# Patient Record
Sex: MALE | Race: WHITE | NOT HISPANIC OR LATINO | Employment: OTHER
[De-identification: names, ages, dates, MRNs, and addresses within clinical notes are randomized per-mention and may not be internally consistent; named-entity substitution may affect disease eponyms.]

---

## 2019-11-02 ENCOUNTER — HEALTH MAINTENANCE LETTER (OUTPATIENT)
Age: 59
End: 2019-11-02

## 2020-11-14 ENCOUNTER — HEALTH MAINTENANCE LETTER (OUTPATIENT)
Age: 60
End: 2020-11-14

## 2021-09-12 ENCOUNTER — HEALTH MAINTENANCE LETTER (OUTPATIENT)
Age: 61
End: 2021-09-12

## 2022-01-02 ENCOUNTER — HEALTH MAINTENANCE LETTER (OUTPATIENT)
Age: 62
End: 2022-01-02

## 2022-06-15 NOTE — TELEPHONE ENCOUNTER
REFERRAL INFORMATION:    Referring Provider:  Self    Reason for Visit/Diagnosis: ** Hernia (Inguinal)// RECS in epic and [facility] in [place]// appt per pt       FUTURE VISIT INFORMATION:    Appointment Date: 6.21.22    Appointment Time: 11:30     NOTES RECORD STATUS  DETAILS

## 2022-06-20 ENCOUNTER — PATIENT OUTREACH (OUTPATIENT)
Dept: SURGERY | Facility: CLINIC | Age: 62
End: 2022-06-20
Payer: COMMERCIAL

## 2022-06-20 NOTE — PROGRESS NOTES
Patient Telephone Reminder Call    Date of call:  06/20/22  Phone numbers:  Cell number on file:    Telephone Information:   Mobile [phone]       Reached patient/confirmed appointment:  No - left message:   on voicemail  Appointment with:   Dr. Michele Stearns  Reason for visit:  Hernia

## 2022-06-21 ENCOUNTER — OFFICE VISIT (OUTPATIENT)
Dept: SURGERY | Facility: CLINIC | Age: 62
End: 2022-06-21
Payer: COMMERCIAL

## 2022-06-21 ENCOUNTER — PRE VISIT (OUTPATIENT)
Dept: SURGERY | Facility: CLINIC | Age: 62
End: 2022-06-21

## 2022-06-21 VITALS
BODY MASS INDEX: 34.6 KG/M2 | OXYGEN SATURATION: 95 % | HEIGHT: 76 IN | HEART RATE: 68 BPM | DIASTOLIC BLOOD PRESSURE: 73 MMHG | WEIGHT: 284.1 LBS | SYSTOLIC BLOOD PRESSURE: 133 MMHG

## 2022-06-21 DIAGNOSIS — K42.9 UMBILICAL HERNIA WITHOUT OBSTRUCTION AND WITHOUT GANGRENE: ICD-10-CM

## 2022-06-21 DIAGNOSIS — K40.20 BILATERAL INGUINAL HERNIA WITHOUT OBSTRUCTION OR GANGRENE, RECURRENCE NOT SPECIFIED: Primary | ICD-10-CM

## 2022-06-21 PROCEDURE — 99203 OFFICE O/P NEW LOW 30 MIN: CPT | Performed by: SURGERY

## 2022-06-21 RX ORDER — CEFAZOLIN SODIUM IN 0.9 % NACL 3 G/100 ML
3 INTRAVENOUS SOLUTION, PIGGYBACK (ML) INTRAVENOUS
Status: CANCELLED | OUTPATIENT
Start: 2022-06-21

## 2022-06-21 RX ORDER — ROSUVASTATIN CALCIUM 20 MG/1
1 TABLET, COATED ORAL EVERY EVENING
COMMUNITY
Start: 2021-02-08

## 2022-06-21 RX ORDER — CEFAZOLIN SODIUM IN 0.9 % NACL 3 G/100 ML
3 INTRAVENOUS SOLUTION, PIGGYBACK (ML) INTRAVENOUS SEE ADMIN INSTRUCTIONS
Status: CANCELLED | OUTPATIENT
Start: 2022-06-21

## 2022-06-21 ASSESSMENT — PAIN SCALES - GENERAL: PAINLEVEL: SEVERE PAIN (7)

## 2022-06-21 NOTE — NURSING NOTE
"Chief Complaint   Patient presents with     New Patient     Inguinal hernia       Vitals:    06/21/22 1128   BP: 133/73   BP Location: Left arm   Patient Position: Sitting   Cuff Size: Adult Large   Pulse: 68   SpO2: 95%   Weight: 128.9 kg (284 lb 1.6 oz)   Height: 1.93 m (6' 4\")       Body mass index is 34.58 kg/m .                          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

## 2022-06-21 NOTE — PATIENT INSTRUCTIONS
You met with Dr. Michele Stearns.      Today's visit instructions:    Reminder:  Surgery Requirements  Your surgery will be at [facility] [place]- 5th Floor  You will need to arrive 1.5 - 2 hours early based on the location of your surgery ([facility] 1.5 hours, [facility]  2 hours).  You will need someone to drive you home (over 18 years old) and stay with you for 24 hours after the procedure.  You will need a preop physical with your regular doctor (or PAC if requested by your surgeon) within 30 days of surgery- closer is always better.  Stop any blood thinners (ok to take ASA 81 mg), vitamins, minerals, or herbal supplements 5 days before surgery.  If you are taking a prescribed blood thinner please let us know for specific instructions.  Fasting- a nurse from [facility] will call you 1-2 days before surgery to confirm your procedure and tell you when to stop eating and drinking.   Wash with the soap (Antibacterial, Dial Complete Foam, Hibiclense, or soap given/mailed from the clinic) the night before surgery and morning of surgery. See instructions in the Surgery Packet.  You will need to undergo a COVID-19 test 2-4 days prior to your scheduled procedure.  Please see the handout . Our surgery scheduler can help arrange testing if you do not have a home test.  If you would like a procedure estimate please call Cost of Care at [phone].    If you have questions please contact Nga RN or Cristina RN during regular clinic hours, Monday through Friday 7:30 AM - 4:00 PM, or you can contact us via [facility] at anytime.       If you have urgent needs after-hours, weekends, or holidays please call the hospital at [phone] and ask to speak with our on-call General Surgery Team.    Appointment [phone]  Nurse Advice (Nga or Cristina): [phone]   Surgery Scheduler (Branden): [phone]  Fax: [phone]    After Your Robotic Inguinal Hernia Repair         Incision care    Remove the bandage the day after surgery, but leave the medical tape (Steri-Strips) or glue in place. These will loosen and fall off on their own 1-2 weeks after surgery.   You may take a shower the day after surgery. Carefully wash your incision with soap and water. Do not submerge yourself in water (bath, whirlpool, hot tub, pool, lake) for 14 days after surgery.     Always wash your hands before touching your incisions or removing bandages.   It is not unusual to form a collection of fluid or blood under your incision that may feel firm or squishy- it can take several weeks to months for your body to reabsorb it.  At times, it may even drain.  If that should happen keep the area clean with soap, water,  and cover with a clean gauze dressing. You can change this daily or as needed.     Other medicines   Wait to start aspirin or blood thinners until the day after surgery. You can continue your regular medicines at your normal time the day after surgery.   Your pain medicine may cause constipation (hard, dry stools). To help with this, take the stool softener your doctor gave you or an over-the-counter stool softener or laxative. You can stop taking this when you are no longer taking pain medicine and your bowel movements are back to normal.      For pain or discomfort   Take the narcotic pain medicine your doctor gave you as needed and as instructed on the bottle. If you prefer to use over-the-counter medication, use acetaminophen (Tylenol) or ibuprofen (Advil, Motrin) as instructed on the box. Do not take Tylenol if it is in your narcotic pain medication.   Use an ice pack on your groin for 20 minutes at a time as needed for the first 24 hours. Be sure to protect your skin by putting a cloth between the ice pack and your skin.   After 24 hours you can switch to heat for 20 minutes as needed. Be sure to protect your skin by putting a cloth between the heat pack and your skin.   It is normal to feel a lump in your  groin after surgery. This lump may take up to 8 weeks to go away.   You may experience right shoulder pain after surgery which will go away 1-4 days after your procedure.  This is related to the gas that was used to inflate your abdomen, it gets trapped between your liver and diaphragm. Walk frequently and apply a heating pad (protecting your skin from the heating pad with a barrier such as a towel).      Activities   No driving until you feel it s safe to do so. Don t drive while taking narcotic pain medicine.   You can return to your other activities as normal, no lifting in excess of 15-20 pounds x .      Special equipment   Male Patients:  We recommend that you wear scrotal support for the first 3 days after surgery; however, you can wear it longer if you wish.  We suggest using an athletic supporter (Jock Strap), snug briefs, or Spandex biker shorts.     Diet   You can eat your regular meals after surgery.      When to call the doctor   Call your doctor if you have:   A fever above 101 F (38.3 C) (taken under the tongue), or a fever or chills lasting more than a day.   Redness at the incision site.   Any fluid or blood draining from the incision, especially if it smells bad.    Severe pain that doesn t improve with pain medicine.      Go to the emergency room if:   You can t urinate (pee) or feel pressure when you urinate. We will place a small, thin tube (catheter) to empty your bladder. This needs to stay in place for at least 2 days.      We will call you 2 to 4 days after surgery to review this handout, answer questions and help arrange after-surgery care. If you have questions or concerns, please call [phone] during regular office hours. If you need to call after business hours, call [phone] and ask to page the surgeon on-call.

## 2022-06-21 NOTE — LETTER
6/21/2022       RE: Phi Davidson  [address] [address] [place] [id number]     Dear Colleague,    Thank you for referring your patient, Phi Davidson, to the [facility] GENERAL SURGERY CLINIC Counselor at [facility]. Please see a copy of my visit note below.    Phi Davidson is a 62 year old male with a 3 week history of a left inguinal mass with the following symptoms of lump and pain.    Onset did occur with lifting.  Obstructive symptoms:  no  Urinary difficulties:  no  Chronic cough: no  Constipation:  no  Current level of activity:  Medium, actively retired, works farm, retired controller, home with family near [facility]    Past medical and surgical history, medications, allergies, family history, and social history were reviewed with the patient. Cardiac stent 2012    ROS: 10 point review of systems negative except noted in HPI  PHYSICAL EXAM  General appearance- healthy, alert, and in no distress.  Skin- Skin color and turgor normal.  No obvious rashes.  Neck- Neck is supple without obvious adenopathy.  Lungs- Respiratory effort unlabored.  Gait- Normal.  Overweight.  Abdomen - soft non distended, non tender with small umbilical hernia  BIH, L>R  Impression:  Inguinal hernia, bilateral and umbilical hernia  Recommendation:  Laparoscopic bilateral Inguinal Hernioplasty Robot assisted and open mesh repair umbilical hernia.    A full discussion regarding the alternatives, risks, goals, and potential complications for this surgery was completed today.  The patient understood I would use non recalled mesh and  that the potential problems included but are not limited to:  Infection, bleeding, hematoma, seroma, recurrence, injury to nerve/muscle or involved testicle(if male), ischemic orchitis(if male), and chronic pain.    The patient verbally expressed understanding, was given the opportunity for questions, and gives full informed consent for the procedure.       Today the patient was instructed on the need for a preoperative H&P, NPO status prior to surgery, and the need to stop anticoagulants prior to surgery.  Additional educational material, soap, and instructions will be mailed out to the patients home.    The total time spent with this patient was 30 minutes.  The total time was spent on the date of encounter doing chart review, history and physical, dressing changes, documentation, patient education, and any further activity as noted above.      Sincerely,    Michele Stearns MD

## 2022-06-21 NOTE — NURSING NOTE
Pre and Post op Patient Education/Teaching Flowsheet  Relevant Diagnosis:  Bilateral Inguinal Hernia (K40.20)  Teaching Topic:  Pre and post op teaching  Person(s) Involved in teaching:  Patient     Motivation Level:  Asks Questions:  Yes  Eager to Learn:  Yes  Cooperative:  Yes  Receptive (willing/able to accept information):  Yes  Any cultural factors/[employer] beliefs that may influence understanding or compliance?  No    Patient/caregiver/family demonstrates understanding of the following:  Reason for the appointment, diagnosis, and treatment plan:  Yes  Patient demonstrates understanding of the following:  Pre-op bowel prep:  No  Post-op pain management recommendations (medications, ice compress, binder/athletic supporter (if applicable), etc.:  Yes  Inguinal hernia patients:  Post-op urinary retention- discussed signs/symptoms and visit to ER for Rojas catheter placement and to stay in place for at least 48 hours:  Yes  Restrictions:  Yes  Medications to take the day of surgery:  Per PCP  Blood thinner medications discussed and when to stop (if applicable):  Yes - MAY CONTINUE ASA 81 MG DAILY  Wound care:  Yes  Diabetes medication management (if applicable):  Per PCP  Which situations necessitate calling provider and whom to contact:  Discussed how to contact the hospital, nurse, and clinic scheduling staff if necessary      Date and time of surgery:  TBD  Location of surgery: [facility]- 5th Floor  History and Physical and any other testing necessary prior to surgery:  Yes  Required time line for completion of History and Physical and any pre-op testing:  Yes- PAC  Discuss need for someone to drive patient home and stay with them for 24 hours:  Yes  Pre-op showering/scrub information with Surgical Scrub:  Yes  NPO Guidelines:  NPO per Anesthesia Guidelines  COVID-19 Testing:  Yes    Infection Prevention: Patient demonstrates understanding of the following:  Patient  instructed on hand hygiene:  Yes  Surgical procedure site care will be taught and will be reviewed at the time of discharge  Signs and symptoms of infection taught:  Yes  Wound care reviewed and will be taught at the time of discharge  Central venous catheter care will be taught at the time of discharge (if applicable)    Post-op follow-up:  Instructional materials used/given/mailed:  Surgical logistics, post op teaching sheet, and surgical scrub

## 2022-06-28 ENCOUNTER — PATIENT OUTREACH (OUTPATIENT)
Dept: SURGERY | Facility: CLINIC | Age: 62
End: 2022-06-28

## 2022-06-28 PROBLEM — K42.9 UMBILICAL HERNIA WITHOUT OBSTRUCTION AND WITHOUT GANGRENE: Status: ACTIVE | Noted: 2022-06-28

## 2022-06-28 PROBLEM — K40.20 BILATERAL INGUINAL HERNIA WITHOUT OBSTRUCTION OR GANGRENE, RECURRENCE NOT SPECIFIED: Status: ACTIVE | Noted: 2022-06-28

## 2022-06-28 NOTE — LETTER
2022      TO: Phi Davidson  [id number] [address] [address]           SURGERY PACKET            Your surgery is scheduled:    Date: 22  ________________________________    Time: 9 AM  ________________________________    Please arrive at:  7:30 AM  ______________________    Surgeon's Name: Dr. Michele Stearns  _______________________  Adult  required upon discharge      Pre-Op Physical Fax Numbers:  [facility] Fax: [phone] / Phone:  [phone]        Your surgery is located at:  [facility]  [address] - [address] [place] [address]  [phone]     Nurse Line: [phone]   [phone]     *Times are tentative and may change. You can expect a call from the pre-admission nurses to confirm arrival and surgery start time if the times should change.    Pre-operative Physical appointment:  Clinic appointment with Pre-Operative Assessment [facility] (PAC) on 22 at 11 AM with Suzanna Harris (video)    Pre-operative COVID-19 Test:  Patient will do an at home test on 22 and bring a photo to the procedure            Before Your Surgery  For Patients and Visitors at [place]    Welcome  As you get ready for surgery, you may have a lot of questions.  This brochure will help you know what to expect before and after surgery.  You and your family are the most important members of your health care team.  You will need to take an active role in your care.    Be sure to ask questions and learn all that you can about your surgery.  If you have any safety concerns, we urge you to tell a nurse as soon as possible.   This brochure is for information only.  It does not replace the advice of your doctor.  Always follow your doctor's advice.    Please tell us if you need a .    GETTING READY FOR SURGERY  Always follow your surgeon's instructions.  If you don't, your surgery could be  canceled.  Please use the following checklist.    Within 30 Days of Surgery:    Have a pre-surgery physical exam with your family doctor or partner.    If you use a [facility], all of your information from the pre-op physical will be in the [employer] computer system.    Ask the doctor to send all of your results to the hospital before the surgery.  The doctor also may ask you to bring the results with you on the day of surgery or you can fax them to [facility] Fax: [phone] / Phone:  [phone].    Tell the doctor if:    You are allergic to latex or rubber  (Latex and rubber gloves are often used in medical care).    You are taking any medicines (including aspirin), vitamins (Vitamin E, Fish Oil, Omegas) or herbal products.  You will need to stop taking some medicines before surgery.    You have any medical problems (allergies, diabetes or heart disease, for example).    You have a pacemaker or an AICD (automatic implanted cardiac defibrillator).  If you do, please bring the ID card with you on the day of surgery.    You are a smoker.  People who smoke have a higher risk of infection after surgery.  Ask your doctor how you can quit smoking.    Within 7 days of Surgery:    Pre-register with the hospital.  Please use the hospital's phone number, [phone]. Or, to register online, go to [email or web address].      Prior to your surgical procedure, a nurse will be contacting you to obtain a health history [facility] Fax: [phone] / Phone:  [phone].  Additionally, someone from the Admissions Department will also contact you for preregistration ([phone]).      Call your insurance company.  Ask if you need pre-approval for your surgery.  If you do not have insurance, please let us know.      Arrange for someone to drive you home after surgery.  If you will have same-day surgery, you may not drive or take public transportation home by  yourself.    Arrange for someone to stay with you for 24 hours after you go home.  This person must be a responsible adult (ie- Family member or friend).    The Day Before Surgery:     Call your surgeon if there are any changes in your health.  This includes signs of a cold or flu (such as a sore throat, runny nose, cough, rash or fever).    Do not smoke, drink alcohol or take over-the-counter medicine (unless your surgeon tells you to) for 24 hours before and after surgery.    If you take prescribed drugs:  You may need to stop them until after the surgery.  Follow your doctor's orders.  You may resume Aspirin and/or blood thinners after your surgery as directed by your physician/surgeon.    No solid food after midnight.  Clear liquids only after midnight until 2 hours before your surgery start time. Then, switch to nothing orally. You need to have an empty stomach before surgery.  This will make the surgery as safe as possible.  If you don't follow your doctor's orders, your surgery could be changed to another date.    A nurse will call you within a few days of surgery to go over these and other instructions.    If you do not hear from them, please call them at [facility] Fax: [phone] / Phone:  [phone]    The Day of Surgery:    Take a shower or bath the night before and the morning of surgery.  Use antiseptic soap or the soap your surgeon gave you.  Gently clean the skin.  Do not shave or scrub your surgery site.    Please remove deodorant, cologne, scented lotion, makeup, nail polish and jewelry (including rings and body piercings).  If you wear artificial nails, please remove at least one nail before coming to the hospital.    Wear clean, loose clothing to the hospital.    Bring these items to the hospital:  1. Your insurance card.  2. Money for parking and co-pays (for medicines or the surgery), if needed.   3. A list of all the medicines you take.  Include vitamins, minerals,  herbs and over-the-counter drugs.  Note any drug allergies.  4. A copy of your advance health care directive, if you have one.  This tells us what treatment you would want -- and who would make health care decisions -- if you could no longer speak for yourself.  You may request this form in advance or download it from [email or web address].  5. A case for any glasses, contact lenses, hearing aids or dentures.  6. Your inhaler or CPAP machine, if you use these at home.    Leave extra cash, jewelry and other valuables at home.    When You Arrive:  When you get to the hospital, you will:    Check in.  If you are under age 18, you must be with a parent or legal guardian.    Sign consent forms, if you haven't already.  These forms state that you know the risks and benefits of surgery.  When you sign the forms, you give us permission to do the surgery.  Do not sign them unless you understand what will happen during and after your surgery.  If you have any questions about your surgery, ask to speak with your doctor before you sign the forms.  If you don't understand the answers, ask again.    Receive a copy of the Patients Bill of Rights.  If you do not receive a copy, please ask for one.    Change into hospital clothes.  Your belongings will be placed in a bag.  We will return them to you after surgery.    Meet with the anesthesia provider.  He or she will tell you what kind of anesthesia (medicine) will be used to keep you comfortable during surgery.    Remember: It's okay to remind doctors and nurses to wash their hands before touching you.     In most cases, your surgeon will use a marker to write his or her initials on the surgery site.  This ensures that the exact site is operated on.    For safety reasons, we will ask you the same questions many times.  For example, we may ask your name and birth date over and over again.    Please note that cell phones are not allowed in some patient care areas.    If you have  "questions about what will happen in the operating room, talk to your care team.    After Surgery:    We will move you to a recovery room where we will watch you closely.  If you have any pain or discomfort, tell your nurse.  He or she will try to make you comfortable.      If you are staying overnight we will move you to your hospital room after you are awake.    If you are going home we will move you to another room.  The length of time you spend in recovery depends on the type of medicine you received, your medical condition, and the type of surgery you had.    Dealing with pain:  A nurse will check your comfort level often during your stay.  He or she will work with you to manage your pain.  Remember:    All pain is real.  There are many ways to control pain.  We can help you decide what works best for you.    Ask for pain medicine when you need it.  Don't try to \"tough it out\" -- this can make you feel worse.  Always take your medicine as ordered.    Medicine doesn't work the same for everyone.  If your medicine isn't working tell your nurse.  There may be other medicines or treatments we can try.    Going Home:  We will let you know when you're ready to leave the hospital.  Before you leave, we will tell you how to care for yourself at home and prevent infections.  If you do not understand something, please say so.  We will answer any questions you have.  We will then help you get ready to leave.    You must have an adult with you for the first 24 hours after you leave the hospital. Take it easy when you get home.  You will need some time to recover -- you may be more tired than you realize at first.  Rest and relax for at least the first 24 hours at home.  You'll feel better and heal faster if you take good care of yourself.                      Pre-Operative Surgery Scrub    Purpose:   The skin harbors a large variety of bacteria, both infectious and noninfectious.  Showering with an antiseptic soap prior to " an invasive procedure will decrease the number of transient and resident (good and bad) bacteria that is normally found on the skin.    Procedure:      Shower or bathe with 1/2 of the bottle of antiseptic soap the evening before and 1/2 of the bottle the morning of surgery (bathing the day of the procedure is most important).       Apply the soap at full strength (use the entire bottle).  Gently clean the skin, rinse, and dry with a clean towel that is freshly laundered (out of the dryer) and then put on clean clothing that is freshly laundered.        We have given you information regarding pre-op showering.  We recommend that patients wash with an antiseptic soap prior to surgery.  This cleanser will be given to you at the clinic or mailed to your home.  It is advised that you liberally wash the specific area surgery area the night before, and again in the morning before the surgery.  Do not apply lotion afterward.  We would like to keep the skin as clean as possible.    Thank you for following these important instructions.      You have been scheduled for surgery and we would like to give you some information that will assist in helping get the best possible outcome.      Before Surgery:   If for any reason you decide not to have the surgery, please contact our office.  We can easily cancel or reschedule the procedure. Please call the  at [phone].      Any pain related to the surgery that occurs before the surgery needs to be reported and managed by your primary care or referring doctor.      Please keep in mind that the time of surgery is subject to change.  Make sure you have nothing to eat or drink after midnight.  If your surgery is later in the afternoon, this recommendation might change, but not until the day before surgery after the actual time of the surgery has been established.    After Surgery:  When you are discharged from the recovery room, the nurses will review instructions with you  and your caregiver.      Please wash your hands every time you touch the wound or change bandages or dressings.      Do not submerge the wound in water.  You may not use a bathtub or hot tub until the wound is closed.  The wait time frame is generally 2-3 weeks but any open area can be a source of incoming bacteria, so it is better to be on the safe side and avoid the tub until your wound is fully healed.      You may take a shower 24 hours after surgery.  Double check with your surgeon if it is ok for water to run over a wound, whether it has been sutured, stapled, glued or is open.  You may gently wash the wound using the antiseptic soap provided for your pre-surgery showering (do not use a washcloth).  Any mild soap will work as well.      Many surgical wounds will have small white strips of tape on them called Steri Strips.  Do not remove these.  The edges will curl and fall off within 7-10 days with normal showering.      If you are going home with sutures (stitches) or staples, you must return to the clinic to have them taken out, usually within 1-2 weeks.      Signs and symptoms of infection include:  1. Fever, temperature over 101.5 ' F  2. Redness  3. Swelling  4. Increasing pain  5. Green or yellow drainage which may or may not have a foul odor.    Symptoms of infection need to be reported to your surgery office. Please call the nurse at [phone].     Preparing for Your Procedure During the COVID-19 Pandemic    Thank you for choosing [facility] for your health care needs. This is a very challenging time for everyone. The World Health Organization and the [facility] have declared the COVID-19 virus a pandemic.    Our goal is to keep you and our team here at [facility] safe and healthy. We ve taken several steps to make this happen. For example:    We screen our staff and care teams for COVID-19.    Everyone at [facility] must wear a mask.    We are limiting hospital and  clinic visitors.    Before your surgery or procedure  All patients must get tested for the COVID-19 virus before any surgery or procedure.     After the test, please stay at home and stay out of contact with other people. It will be harder for you to recover if you get COVID-19 before your surgery.    So, please follow all current safety guidelines, including:    Limit trips outside your home.    Limit the number of people you see.    Always wear a mask outside your home.    Use social distancing. (Stay 6 feet away from others whenever you can.)    Wash your hands often.    If your test shows you have COVID-19  If your test is positive, we ll let you know. A positive test means that you have the virus.  It s likely that we ll have to postpone your surgery or procedure. Your doctor will discuss this with you. After that, we ll let you know what to do and when you can reschedule.  We may have to cancel your surgery or procedure on short notice for other reasons, too.    If your test shows you DON T have COVID-19  Even if your test is negative, you may still get COVID-19. It s rare but, sometimes, the test result is wrong. You could also catch the virus after taking the test.  There s a very small chance that you could catch COVID-19 in the hospital or surgery center. [facility] has taken many steps to prevent this from happening.    Day of your surgery or procedure    Please come wearing a mask or other face covering.    When you arrive, we ll ask you some questions to find out if you have any signs or symptoms of COVID-19.    One consistent visitor is allowed for adult inpatients, outpatients, and Emergency Department patients.       Adult surgical patients can have one visitor, only before surgery.      Two consistent visitors are allowed for pediatric patients in all areas (this guidance is now extended to outpatients).     We ll share your after-care instructions with you and anyone else you name.    Please  call your care team, hospital or surgery center if you have any questions. We thank you for your understanding and for choosing [facility] for your care.     If you or your  are deaf or hard of hearing, or prefer a language other than English, please let us know.  We have many free services, including interpreters and other aids to help you communicate. You may ask for help  through any member of your care team or by calling Language Services at [phone], option 2.

## 2022-06-28 NOTE — PROGRESS NOTES
Surgery Scheduled    Date of surgery:  7/21/22    Time of Surgery:  0900    Arrival time:  0730    Surgeon:  Dr. Michele Stearns    Procedure:  robotic inguinal hernia repair and open umbilical    Anesthesia:  General    OR Location:  [facility]- 5th Floor    H&P:  PAC on 7/7 at 1100- video    COVID Testing:  Patient will do an at home test on 7/19/22 and bring a photo to the procedure    Bowel Prep:  No     and someone to stay with the patient for 24 hours post procedure:  Yes     Surgery packet:  Mailed to patient

## 2022-06-28 NOTE — TELEPHONE ENCOUNTER
FUTURE VISIT INFORMATION      SURGERY INFORMATION:    Date: 22    Location:  or    Surgeon:  Michele Stearns MD    Anesthesia Type:  general    Procedure: HERNIORRHAPHY, BILATERAL INGUINAL, ROBOT-ASSISTED HERNIORRHAPHY, UMBILICAL, OPEN    Consult: ov     RECORDS REQUESTED FROM:       Primary Care Provider: Gerald Stone M.D.  - [place]    Pertinent Medical History: hypertension, CAD    Most recent EKG+ Tracin2009    Most recent Cardiac Stress Test: 18- Kathy

## 2022-07-07 ENCOUNTER — VIRTUAL VISIT (OUTPATIENT)
Dept: SURGERY | Facility: CLINIC | Age: 62
End: 2022-07-07
Payer: COMMERCIAL

## 2022-07-07 ENCOUNTER — ANESTHESIA EVENT (OUTPATIENT)
Dept: SURGERY | Facility: AMBULATORY SURGERY CENTER | Age: 62
End: 2022-07-07
Payer: COMMERCIAL

## 2022-07-07 ENCOUNTER — PRE VISIT (OUTPATIENT)
Dept: SURGERY | Facility: CLINIC | Age: 62
End: 2022-07-07

## 2022-07-07 DIAGNOSIS — Z01.818 PRE-OP EVALUATION: Primary | ICD-10-CM

## 2022-07-07 DIAGNOSIS — K42.9 UMBILICAL HERNIA WITHOUT OBSTRUCTION AND WITHOUT GANGRENE: ICD-10-CM

## 2022-07-07 DIAGNOSIS — K40.20 BILATERAL INGUINAL HERNIA WITHOUT OBSTRUCTION OR GANGRENE, RECURRENCE NOT SPECIFIED: ICD-10-CM

## 2022-07-07 PROCEDURE — 99203 OFFICE O/P NEW LOW 30 MIN: CPT | Mod: 95 | Performed by: NURSE PRACTITIONER

## 2022-07-07 ASSESSMENT — PAIN SCALES - GENERAL: PAINLEVEL: NO PAIN (0)

## 2022-07-07 ASSESSMENT — LIFESTYLE VARIABLES: TOBACCO_USE: 1

## 2022-07-07 ASSESSMENT — ENCOUNTER SYMPTOMS: ORTHOPNEA: 0

## 2022-07-07 NOTE — PROGRESS NOTES
Phi is a 62 year old who is being evaluated via a billable video visit.      How would you like to obtain your AVS? MyChart  If the video visit is dropped, the invitation should be resent by: Text to cell phone: [phone]     HPI       Review of Systems         Objective    Vitals - Patient Reported  Pain Score: No Pain (0) (Hernia pain)        Physical Exam     .  ..

## 2022-07-07 NOTE — H&P
Pre-Operative H & P     CC:  Preoperative exam to assess for increased cardiopulmonary risk while undergoing surgery and anesthesia.    Date of Encounter: 7/7/2022  Primary Care Physician:  Gerald Stone     Reason for visit:   Encounter Diagnoses   Name Primary?     Bilateral inguinal hernia without obstruction or gangrene, recurrence not specified      Umbilical hernia without obstruction and without gangrene        [facility]  Phi Davidson is a 62 year old male who presents for pre-operative H & P in preparation for  Procedure Information     Case: [id number] Date/Time: 07/21/22 0915    Procedures:       HERNIORRHAPHY, BILATERAL INGUINAL, ROBOT-ASSISTED (Bilateral Abdomen)      HERNIORRHAPHY, UMBILICAL, OPEN (N/A Abdomen)    Anesthesia type: General    Diagnosis:       Bilateral inguinal hernia without obstruction or gangrene, recurrence not specified [K40.20]      Umbilical hernia without obstruction and without gangrene [K42.9]    Pre-op diagnosis:       Bilateral inguinal hernia without obstruction or gangrene, recurrence not specified [K40.20]      Umbilical hernia without obstruction and without gangrene [K42.9]    Location: [place] [address] / [facility]    Providers: Michele Stearns MD          Phi Davidson is a 62 year old male with a history of CAD s/p BAILEE 2009, HTN, HL, GHANSHYAM, Hypersomnia with sleep apnea. He noted several weeks ago after heavy lifting a palpable lump associated with pain. He consulted with Dr. Stearns on exam he was noted to have bilateral inguinal hernia L> R and umbilical hernia. He presents today in preparation of the above procedure.    History is obtained from the patient and chart review    Hx of abnormal bleeding or anti-platelet use: asa      Past Medical History  Past Medical History:   Diagnosis Date     Chest pain, unspecified 9/10-9/11/99    Hospitalized - Chest pain - Non-cardiac     Coronary artery disease      Pure  hypercholesterolemia      Tobacco use disorder     Tobacco abuse     Unspecified essential hypertension      Unspecified hearing loss      Unspecified sinusitis (chronic)     Recurrent       Past Surgical History  Past Surgical History:   Procedure Laterality Date     ANGIOGRAM  5/6/2009 @ [facility]    with stent placement - 2.5 x 15mm Xience drug-eluting stent     HC VASECTOMY UNILAT/BILAT W POSTOP SEMEN  9/10/98    Vasectomy       Prior to Admission Medications  Current Outpatient Medications   Medication Sig Dispense Refill     ASPIRIN 81 MG OR TABS Take by mouth every morning 100 3     atenolol (TENORMIN) 50 MG tablet Take 1 tablet (50 mg) by mouth daily (Patient taking differently: Take 50 mg by mouth every morning) 90 tablet 3     azelastine (ASTELIN) 137 MCG/SPRAY nasal spray Spray 1-2 sprays into both nostrils 2 times daily (Patient taking differently: Spray 1-2 sprays into both nostrils as needed) 3 Bottle 3     fluticasone (FLONASE) 50 MCG/ACT nasal spray Spray 1-2 sprays into both nostrils daily (Patient taking differently: Spray 1-2 sprays into both nostrils daily as needed) 1 Package 11     IBUPROFEN  MG OR TABS every 4 hours as needed       lisinopril (PRINIVIL,ZESTRIL) 5 MG tablet Take 1 tablet (5 mg) by mouth daily (Patient taking differently: Take 5 mg by mouth every morning) 90 tablet 3     rosuvastatin (CRESTOR) 20 MG tablet Take 1 tablet by mouth every evening       testosterone (ANDROGEL 1% PUMP) 12.5 MG/ACT (1%) gel Apply 4 pumps (5 g of gel) from dispenser daily to clean, dry, intact skin of the shoulders, upper arms, or abdomen. (Patient taking differently: every morning Apply 4 pumps (5 g of gel) from dispenser daily to clean, dry, intact skin of the shoulders, upper arms, or abdomen.) 1 Month 11     traZODone (DESYREL) 150 MG tablet Take 0.5 tablets (75 mg) by mouth At Bedtime 45 tablet 3     ORDER FOR DME PRESCRIPTION FOR: replacement CPAP and tubing/mask as appropriate to continue at  current settings 1 0     ORDER FOR DME cpap  0     pravastatin (PRAVACHOL) 40 MG tablet Take 1 tablet (40 mg) by mouth daily (Patient not taking: No sig reported) 90 tablet 3       Allergies  Allergies   Allergen Reactions     Meclizine      Increased dizziness     Sulfa Drugs      hives       Social History  Social History     Socioeconomic History     Marital status:      Spouse name: Juanita     Number of children: 2     Years of education: Not on file     Highest education level: Not on file   Occupational History     Occupation:      Employer: [employer]   Tobacco Use     Smoking status: Former Smoker     Packs/day: 1.00     Years: 20.00     Pack years: 20.00     Types: Cigarettes     Quit date: 1992     Years since quittin.5     Smokeless tobacco: Never Used     Tobacco comment: no secondhand smoke exposure   Substance and Sexual Activity     Alcohol use: Yes     Comment: approx. 4-5 drinks per weekend     Drug use: No     Sexual activity: Yes     Partners: Female   Other Topics Concern      Service No     Blood Transfusions No     Caffeine Concern No     Occupational Exposure Not Asked     Hobby Hazards Not Asked     Sleep Concern Yes     Comment: trouble staying asleep     Stress Concern No     Weight Concern Yes     Special Diet No     Back Care No     Exercise No     Bike Helmet Not Asked     Seat Belt Yes     Self-Exams Not Asked     Parent/sibling w/ CABG, MI or angioplasty before 65F 55M? Not Asked   Social History Narrative     Not on file     Social Determinants of Health     Financial Resource Strain: Not on file   Food Insecurity: Not on file   Transportation Needs: Not on file   Physical Activity: Not on file   Stress: Not on file   Social Connections: Not on file   Intimate Partner Violence: Not on file   Housing Stability: Not on file       Family History  Family History   Problem Relation Age of Onset     Diabetes Other         Positive family history      Hypertension Other         Positive family history     Lipids Other         Positive family history     Heart Disease Father         MI/ at 57 of MI     Cancer Father         lymph node, near ear/lymph node     Lipids Father      Diabetes Mother      Hypertension Mother      Eye Disorder Mother         cataract     Hypertension Sister      Hypertension Brother      Cerebrovascular Disease No family hx of      Breast Cancer No family hx of      Cancer - colorectal No family hx of      Prostate Cancer No family hx of      Thyroid Disease No family hx of      Asthma No family hx of      C.A.D. No family hx of      Respiratory Maternal Grandfather         TB       Review of Systems  The complete review of systems is negative other than noted in the HPI or here.   Anesthesia Evaluation   Pt has had prior anesthetic.         ROS/MED HX  ENT/Pulmonary: Comment: Hypersomnia with sleep apnea.     (+) sleep apnea, uses CPAP, tobacco use, Past use,     Neurologic:  - neg neurologic ROS     Cardiovascular:     (+) Dyslipidemia hypertension--CAD --stent-. 1 Drug Eluting Stent. Previous cardiac testing  (-) MAIN and orthopnea/PND   METS/Exercise Tolerance: >4 METS    Hematologic:  - neg hematologic  ROS     Musculoskeletal:  - neg musculoskeletal ROS     GI/Hepatic: Comment: Bilateral Inguinal Hernias    (+) hiatal hernia,     Renal/Genitourinary:  - neg Renal ROS     Endo:  - neg endo ROS     Psychiatric/Substance Use:  - neg psychiatric ROS     Infectious Disease: Comment: Not vaccinated for covid - neg infectious disease ROS     Malignancy:  - neg malignancy ROS     Other:            Virtual visit -  No vitals were obtained    Physical Exam  Constitutional: Awake, alert, cooperative, no apparent distress, and appears stated age.  Eyes: Pupils equal  HENT: Normocephalic  Respiratory: non labored breathing   Neurologic: Awake, alert, oriented to name, place and time.   Neuropsychiatric: Calm, cooperative. Normal  affect.      Prior Labs/Diagnostic Studies   All labs and imaging personally reviewed     EKG/ stress test - if available please see in ROS above   No results found.  No flowsheet data found.      The patient's records and results personally reviewed by this provider.     Outside records reviewed from: Care Everywhere      Assessment      Phi Davidson is a 62 year old male seen as a PAC referral for risk assessment and optimization for anesthesia.    Plan/Recommendations  Pt will be optimized for the proposed procedure.  See below for details on the assessment, risk, and preoperative recommendations    NEUROLOGY  - No history of TIA, CVA or seizure    -Post Op delirium risk factors:  No risk identified    ENT  - No current airway concerns.  Will need to be reassessed day of surgery.  Mallampati: Unable to assess  TM: Unable to assess    CARDIAC  No anginal symptoms, Denies palpitations, PND, dizziness or syncope.   - No history of Afib   - CAD  BAILEE-2009  - Hypertension- managed on BB and ACE  - Hyperlipidemia  Well controlled on home regimen  - METS (Metabolic Equivalents)  Patient performs 4 or more METS exercise without symptoms            Total Score: 0      RCRI-Low risk: Class 2 0.9% complication rate            Total Score: 1    RCRI: CAD        PULMONARY  - Obstructive Sleep Apnea  GHANSHYAM with home CPAP.  Patient will be instructed to bring their home CPAP device to the hospital with them.  GHANSHYAM Low Risk            Total Score: -        Criteria with incomplete data:    GHANSHYAM: Snores loudly    GHANSHYAM: Often tired    GHANSHYAM: Observed stopped breathing    GHANSHYAM: Hypertension    GHANSHYAM: BMI over 35 kg/m2    GHANSHYAM: Over 50 ys old    GHANSHYAM: Neck Circum >16 in    GHANSHYAM: Male      - This score is not calculated because of inadequate data     - Denies asthma or inhaler use  - Tobacco History      History   Smoking Status     Former Smoker     Packs/day: 1.00     Years: 20.00     Types: Cigarettes     Quit date: 12/24/1992   Smokeless  "Tobacco     Never Used     Comment: no secondhand smoke exposure       GI  Bilateral inguinal hernias, umbilical hernia, above procedure now scheduled.    PONV Low Risk  Total Score: 1           1 AN PONV: Patient is not a current smoker            ENDOCRINE    - BMI: Estimated body mass index is 34.58 kg/m  as calculated from the following:    Height as of 6/21/22: 1.93 m (6' 4\").    Weight as of 6/21/22: 128.9 kg (284 lb 1.6 oz).  Obesity (BMI >30)  - No history of Diabetes Mellitus    HEME  VTE Low Risk 0.5%            Total Score: 3    VTE: Greater than 59 yrs old    VTE: Male      - No history of abnormal bleeding or antiplatelet use.      MSK  Patient is NOT Frail            Total Score: -        Criteria with incomplete data:    Frailty: Weight loss 10 lbs or greater    Frailty: Slower walking speed    Frailty: Decrease in strength    Frailty: Increased exhaustion    Frailty: Overall lack of energy      - This score is not calculated because of inadequate data           The patient is optimized for their procedure. AVS with information on surgery time/arrival time, meds and NPO status given by nursing staff. No further diagnostic testing indicated.    Please refer to the physical examination documented by the anesthesiologist in the anesthesia record on the day of surgery.    Video-Visit Details    Type of service:  Video Visit    Patient verbally consented to video service today: YES    Video Start Time: 10:50 AM  Video End Time (time video stopped): 11:04 AM    Originating Location (pt. Location): Home    Distant Location (provider location):  [facility] PREOPERATIVE ASSESSMENT CENTER     Mode of Communication:  Video Conference via [employer]  On the day of service:     Prep time: 10  minutes  Visit time: 14 minutes  Documentation time: 10 minutes  ------------------------------------------  Total time: 34 minutes      VICKI Galaviz CNP  Preoperative Assessment Center  [facility]  Phone: [phone]  Fax: [phone]

## 2022-07-07 NOTE — PATIENT INSTRUCTIONS
Preparing for Your Surgery      Name:  Phi Davidson   MRN:  [id number]   :  1960   Today's Date:  2022       Arriving for surgery:  Surgery date:  22  Arrival time:  07:45 am    Restrictions due to COVID 19       Effective 22 [facility] is implementing the following visitor policy:     1 person may accompany the patient through the Pre-Op process.      That same person may wait in the Surgery Waiting room, provided there is enough room to social distance           Visitors must wear a mask.      Visitors must not be ill.        Inpatients are allowed 2 visitors per day for the duration of their stay.      Visiting hours are 8 am to 8 pm.    [employer] parking is available for anyone with mobility limitations or disabilities.  ([facility]  24 hours/ 7 days a week; [facility]  7 am- 3:30 pm, Mon- Fri)    Please come to:   [facility] [address] [address]  -  Proceed to the 5th floor to check into the Ambulatory Surgery Center.              >> There will be patient concierges on the 1st and 5th floor, for assistance or an escort, if you would like.              >> Please call [phone] with any questions.    What can I eat or drink?  -  You may eat and drink normally for up to 8 hours before your surgery.   -  You may have clear liquids until 2 hours before surgery.     Examples of clear liquids:  Water  Clear broth  Juices (apple, white grape, white cranberry  and cider) without pulp  Noncarbonated, powder based beverages  (lemonade and Tu-Aid)  Sodas (Sprite, 7-Up, ginger ale and seltzer)  Coffee or tea (without milk or cream)  Gatorade    -  No Alcohol for at least 24 hours before surgery     Which medicines can I take?      Hold Aspirin for 7 days before surgery.   Hold Multivitamins for 7 days before surgery.  Hold Supplements for 7 days before surgery.  Hold Ibuprofen (Advil, Motrin) for 1 day before  surgery--unless otherwise directed by surgeon.  Hold Naproxen (Aleve) for 4 days before surgery.  -  DO NOT take these medications the day of surgery:  Lisinopril.  -  PLEASE TAKE these medications the day of surgery:  Tylenol if needed; take other morning medications.    How do I prepare myself?  - Please take 2 showers before surgery using Scrubcare or Hibiclens soap.    Use this soap only from the neck to your toes.     Leave the soap on your skin for one minute--then rinse thoroughly.      You may use your own shampoo and conditioner; no other hair products.   - Please remove all jewelry and body piercings.  - No lotions, deodorants or fragrance.  - No makeup or fingernail polish.   - Bring your ID and insurance card.    -If you have a Deep Brain Stimulator, Spinal Cord Stimulator or any neuro stimulator device---you must bring the remote control to the hospital       ALL PATIENTS GOING HOME THE SAME DAY OF SURGERY ARE REQUIRED TO HAVE A RESPONSIBLE ADULT TO DRIVE AND BE IN ATTENDANCE WITH THEM FOR 24 HOURS FOLLOWING SURGERY.      Questions or Concerns:    - For any questions regarding the day of surgery or your hospital stay, please contact the Pre Admission Nursing Office at [phone].       - If you have health changes between today and your surgery please call your surgeon.       For questions after surgery please call your surgeons office.

## 2022-07-07 NOTE — H&P (VIEW-ONLY)
Pre-Operative H & P     CC:  Preoperative exam to assess for increased cardiopulmonary risk while undergoing surgery and anesthesia.    Date of Encounter: 7/7/2022  Primary Care Physician:  Gerald Stone     Reason for visit:   Encounter Diagnoses   Name Primary?     Bilateral inguinal hernia without obstruction or gangrene, recurrence not specified      Umbilical hernia without obstruction and without gangrene        [facility]  Phi Davidson is a 62 year old male who presents for pre-operative H & P in preparation for  Procedure Information     Case: [id number] Date/Time: 07/21/22 0915    Procedures:       HERNIORRHAPHY, BILATERAL INGUINAL, ROBOT-ASSISTED (Bilateral Abdomen)      HERNIORRHAPHY, UMBILICAL, OPEN (N/A Abdomen)    Anesthesia type: General    Diagnosis:       Bilateral inguinal hernia without obstruction or gangrene, recurrence not specified [K40.20]      Umbilical hernia without obstruction and without gangrene [K42.9]    Pre-op diagnosis:       Bilateral inguinal hernia without obstruction or gangrene, recurrence not specified [K40.20]      Umbilical hernia without obstruction and without gangrene [K42.9]    Location: [place] [address] / [facility]    Providers: Michele Stearns MD          Phi Davidson is a 62 year old male with a history of CAD s/p BAILEE 2009, HTN, HL, GHANSHYAM, Hypersomnia with sleep apnea. He noted several weeks ago after heavy lifting a palpable lump associated with pain. He consulted with Dr. Stearns on exam he was noted to have bilateral inguinal hernia L> R and umbilical hernia. He presents today in preparation of the above procedure.    History is obtained from the patient and chart review    Hx of abnormal bleeding or anti-platelet use: asa      Past Medical History  Past Medical History:   Diagnosis Date     Chest pain, unspecified 9/10-9/11/99    Hospitalized - Chest pain - Non-cardiac     Coronary artery disease      Pure  hypercholesterolemia      Tobacco use disorder     Tobacco abuse     Unspecified essential hypertension      Unspecified hearing loss      Unspecified sinusitis (chronic)     Recurrent       Past Surgical History  Past Surgical History:   Procedure Laterality Date     ANGIOGRAM  5/6/2009 @ [facility]    with stent placement - 2.5 x 15mm Xience drug-eluting stent     HC VASECTOMY UNILAT/BILAT W POSTOP SEMEN  9/10/98    Vasectomy       Prior to Admission Medications  Current Outpatient Medications   Medication Sig Dispense Refill     ASPIRIN 81 MG OR TABS Take by mouth every morning 100 3     atenolol (TENORMIN) 50 MG tablet Take 1 tablet (50 mg) by mouth daily (Patient taking differently: Take 50 mg by mouth every morning) 90 tablet 3     azelastine (ASTELIN) 137 MCG/SPRAY nasal spray Spray 1-2 sprays into both nostrils 2 times daily (Patient taking differently: Spray 1-2 sprays into both nostrils as needed) 3 Bottle 3     fluticasone (FLONASE) 50 MCG/ACT nasal spray Spray 1-2 sprays into both nostrils daily (Patient taking differently: Spray 1-2 sprays into both nostrils daily as needed) 1 Package 11     IBUPROFEN  MG OR TABS every 4 hours as needed       lisinopril (PRINIVIL,ZESTRIL) 5 MG tablet Take 1 tablet (5 mg) by mouth daily (Patient taking differently: Take 5 mg by mouth every morning) 90 tablet 3     rosuvastatin (CRESTOR) 20 MG tablet Take 1 tablet by mouth every evening       testosterone (ANDROGEL 1% PUMP) 12.5 MG/ACT (1%) gel Apply 4 pumps (5 g of gel) from dispenser daily to clean, dry, intact skin of the shoulders, upper arms, or abdomen. (Patient taking differently: every morning Apply 4 pumps (5 g of gel) from dispenser daily to clean, dry, intact skin of the shoulders, upper arms, or abdomen.) 1 Month 11     traZODone (DESYREL) 150 MG tablet Take 0.5 tablets (75 mg) by mouth At Bedtime 45 tablet 3     ORDER FOR DME PRESCRIPTION FOR: replacement CPAP and tubing/mask as appropriate to continue at  current settings 1 0     ORDER FOR DME cpap  0     pravastatin (PRAVACHOL) 40 MG tablet Take 1 tablet (40 mg) by mouth daily (Patient not taking: No sig reported) 90 tablet 3       Allergies  Allergies   Allergen Reactions     Meclizine      Increased dizziness     Sulfa Drugs      hives       Social History  Social History     Socioeconomic History     Marital status:      Spouse name: Juanita     Number of children: 2     Years of education: Not on file     Highest education level: Not on file   Occupational History     Occupation:      Employer: [employer]   Tobacco Use     Smoking status: Former Smoker     Packs/day: 1.00     Years: 20.00     Pack years: 20.00     Types: Cigarettes     Quit date: 1992     Years since quittin.5     Smokeless tobacco: Never Used     Tobacco comment: no secondhand smoke exposure   Substance and Sexual Activity     Alcohol use: Yes     Comment: approx. 4-5 drinks per weekend     Drug use: No     Sexual activity: Yes     Partners: Female   Other Topics Concern      Service No     Blood Transfusions No     Caffeine Concern No     Occupational Exposure Not Asked     Hobby Hazards Not Asked     Sleep Concern Yes     Comment: trouble staying asleep     Stress Concern No     Weight Concern Yes     Special Diet No     Back Care No     Exercise No     Bike Helmet Not Asked     Seat Belt Yes     Self-Exams Not Asked     Parent/sibling w/ CABG, MI or angioplasty before 65F 55M? Not Asked   Social History Narrative     Not on file     Social Determinants of Health     Financial Resource Strain: Not on file   Food Insecurity: Not on file   Transportation Needs: Not on file   Physical Activity: Not on file   Stress: Not on file   Social Connections: Not on file   Intimate Partner Violence: Not on file   Housing Stability: Not on file       Family History  Family History   Problem Relation Age of Onset     Diabetes Other         Positive family history      Hypertension Other         Positive family history     Lipids Other         Positive family history     Heart Disease Father         MI/ at 57 of MI     Cancer Father         lymph node, near ear/lymph node     Lipids Father      Diabetes Mother      Hypertension Mother      Eye Disorder Mother         cataract     Hypertension Sister      Hypertension Brother      Cerebrovascular Disease No family hx of      Breast Cancer No family hx of      Cancer - colorectal No family hx of      Prostate Cancer No family hx of      Thyroid Disease No family hx of      Asthma No family hx of      C.A.D. No family hx of      Respiratory Maternal Grandfather         TB       Review of Systems  The complete review of systems is negative other than noted in the HPI or here.   Anesthesia Evaluation   Pt has had prior anesthetic.         ROS/MED HX  ENT/Pulmonary: Comment: Hypersomnia with sleep apnea.     (+) sleep apnea, uses CPAP, tobacco use, Past use,     Neurologic:  - neg neurologic ROS     Cardiovascular:     (+) Dyslipidemia hypertension--CAD --stent-. 1 Drug Eluting Stent. Previous cardiac testing  (-) MAIN and orthopnea/PND   METS/Exercise Tolerance: >4 METS    Hematologic:  - neg hematologic  ROS     Musculoskeletal:  - neg musculoskeletal ROS     GI/Hepatic: Comment: Bilateral Inguinal Hernias    (+) hiatal hernia,     Renal/Genitourinary:  - neg Renal ROS     Endo:  - neg endo ROS     Psychiatric/Substance Use:  - neg psychiatric ROS     Infectious Disease: Comment: Not vaccinated for covid - neg infectious disease ROS     Malignancy:  - neg malignancy ROS     Other:            Virtual visit -  No vitals were obtained    Physical Exam  Constitutional: Awake, alert, cooperative, no apparent distress, and appears stated age.  Eyes: Pupils equal  HENT: Normocephalic  Respiratory: non labored breathing   Neurologic: Awake, alert, oriented to name, place and time.   Neuropsychiatric: Calm, cooperative. Normal  affect.      Prior Labs/Diagnostic Studies   All labs and imaging personally reviewed     EKG/ stress test - if available please see in ROS above   No results found.  No flowsheet data found.      The patient's records and results personally reviewed by this provider.     Outside records reviewed from: Care Everywhere      Assessment      Phi Davidson is a 62 year old male seen as a PAC referral for risk assessment and optimization for anesthesia.    Plan/Recommendations  Pt will be optimized for the proposed procedure.  See below for details on the assessment, risk, and preoperative recommendations    NEUROLOGY  - No history of TIA, CVA or seizure    -Post Op delirium risk factors:  No risk identified    ENT  - No current airway concerns.  Will need to be reassessed day of surgery.  Mallampati: Unable to assess  TM: Unable to assess    CARDIAC  No anginal symptoms, Denies palpitations, PND, dizziness or syncope.   - No history of Afib   - CAD  BAILEE-2009  - Hypertension- managed on BB and ACE  - Hyperlipidemia  Well controlled on home regimen  - METS (Metabolic Equivalents)  Patient performs 4 or more METS exercise without symptoms            Total Score: 0      RCRI-Low risk: Class 2 0.9% complication rate            Total Score: 1    RCRI: CAD        PULMONARY  - Obstructive Sleep Apnea  GHANSHYAM with home CPAP.  Patient will be instructed to bring their home CPAP device to the hospital with them.  GHANSHYAM Low Risk            Total Score: -        Criteria with incomplete data:    GHANSHYAM: Snores loudly    GHANSHYAM: Often tired    GHANSHYAM: Observed stopped breathing    GHANSHYAM: Hypertension    GHANSHYAM: BMI over 35 kg/m2    GHANSHYAM: Over 50 ys old    GHANSHYAM: Neck Circum >16 in    GHANSHYAM: Male      - This score is not calculated because of inadequate data     - Denies asthma or inhaler use  - Tobacco History      History   Smoking Status     Former Smoker     Packs/day: 1.00     Years: 20.00     Types: Cigarettes     Quit date: 12/24/1992   Smokeless  "Tobacco     Never Used     Comment: no secondhand smoke exposure       GI  Bilateral inguinal hernias, umbilical hernia, above procedure now scheduled.    PONV Low Risk  Total Score: 1           1 AN PONV: Patient is not a current smoker            ENDOCRINE    - BMI: Estimated body mass index is 34.58 kg/m  as calculated from the following:    Height as of 6/21/22: 1.93 m (6' 4\").    Weight as of 6/21/22: 128.9 kg (284 lb 1.6 oz).  Obesity (BMI >30)  - No history of Diabetes Mellitus    HEME  VTE Low Risk 0.5%            Total Score: 3    VTE: Greater than 59 yrs old    VTE: Male      - No history of abnormal bleeding or antiplatelet use.      MSK  Patient is NOT Frail            Total Score: -        Criteria with incomplete data:    Frailty: Weight loss 10 lbs or greater    Frailty: Slower walking speed    Frailty: Decrease in strength    Frailty: Increased exhaustion    Frailty: Overall lack of energy      - This score is not calculated because of inadequate data           The patient is optimized for their procedure. AVS with information on surgery time/arrival time, meds and NPO status given by nursing staff. No further diagnostic testing indicated.    Please refer to the physical examination documented by the anesthesiologist in the anesthesia record on the day of surgery.    Video-Visit Details    Type of service:  Video Visit    Patient verbally consented to video service today: YES    Video Start Time: 10:50 AM  Video End Time (time video stopped): 11:04 AM    Originating Location (pt. Location): Home    Distant Location (provider location):  [facility] PREOPERATIVE [facility]     Mode of Communication:  Video Conference via [employer]  On the day of service:     Prep time: 10  minutes  Visit time: 14 minutes  Documentation time: 10 minutes  ------------------------------------------  Total time: 34 minutes      VICKI Galaviz CNP  Preoperative Assessment Center  [facility] " [place]  [facility]  Phone: [phone]  Fax: [phone]

## 2022-07-21 ENCOUNTER — ANESTHESIA (OUTPATIENT)
Dept: SURGERY | Facility: AMBULATORY SURGERY CENTER | Age: 62
End: 2022-07-21
Payer: COMMERCIAL

## 2022-07-21 ENCOUNTER — HOSPITAL ENCOUNTER (OUTPATIENT)
Facility: AMBULATORY SURGERY CENTER | Age: 62
Discharge: HOME | End: 2022-07-21
Attending: SURGERY
Payer: COMMERCIAL

## 2022-07-21 VITALS
WEIGHT: 285 LBS | DIASTOLIC BLOOD PRESSURE: 65 MMHG | BODY MASS INDEX: 34.7 KG/M2 | SYSTOLIC BLOOD PRESSURE: 101 MMHG | OXYGEN SATURATION: 93 % | HEART RATE: 78 BPM | RESPIRATION RATE: 13 BRPM | TEMPERATURE: 97.6 F | HEIGHT: 76 IN

## 2022-07-21 DIAGNOSIS — K40.20 BILATERAL INGUINAL HERNIA WITHOUT OBSTRUCTION OR GANGRENE, RECURRENCE NOT SPECIFIED: ICD-10-CM

## 2022-07-21 DIAGNOSIS — K42.9 UMBILICAL HERNIA WITHOUT OBSTRUCTION AND WITHOUT GANGRENE: ICD-10-CM

## 2022-07-21 PROCEDURE — 49650 LAP ING HERNIA REPAIR INIT: CPT | Mod: 50 | Performed by: SURGERY

## 2022-07-21 PROCEDURE — 49650 LAP ING HERNIA REPAIR INIT: CPT

## 2022-07-21 DEVICE — MESH PROGRIP LAPAROSCOPIC 5.9X3.9" PARIETEX SELF-FIX LPG1510: Type: IMPLANTABLE DEVICE | Site: GROIN | Status: FUNCTIONAL

## 2022-07-21 RX ORDER — LIDOCAINE HYDROCHLORIDE 20 MG/ML
INJECTION, SOLUTION INFILTRATION; PERINEURAL PRN
Status: DISCONTINUED | OUTPATIENT
Start: 2022-07-21 | End: 2022-07-21

## 2022-07-21 RX ORDER — LIDOCAINE 40 MG/G
CREAM TOPICAL
Status: DISCONTINUED | OUTPATIENT
Start: 2022-07-21 | End: 2022-07-21 | Stop reason: HOSPADM

## 2022-07-21 RX ORDER — PROPOFOL 10 MG/ML
INJECTION, EMULSION INTRAVENOUS CONTINUOUS PRN
Status: DISCONTINUED | OUTPATIENT
Start: 2022-07-21 | End: 2022-07-21

## 2022-07-21 RX ORDER — ACETAMINOPHEN 325 MG/1
975 TABLET ORAL ONCE
Status: COMPLETED | OUTPATIENT
Start: 2022-07-21 | End: 2022-07-21

## 2022-07-21 RX ORDER — ONDANSETRON 2 MG/ML
4 INJECTION INTRAMUSCULAR; INTRAVENOUS EVERY 30 MIN PRN
Status: DISCONTINUED | OUTPATIENT
Start: 2022-07-21 | End: 2022-07-22 | Stop reason: HOSPADM

## 2022-07-21 RX ORDER — FENTANYL CITRATE 50 UG/ML
25 INJECTION, SOLUTION INTRAMUSCULAR; INTRAVENOUS
Status: DISCONTINUED | OUTPATIENT
Start: 2022-07-21 | End: 2022-07-22 | Stop reason: HOSPADM

## 2022-07-21 RX ORDER — AMOXICILLIN 250 MG
1-2 CAPSULE ORAL 2 TIMES DAILY
Qty: 30 TABLET | Refills: 0 | Status: SHIPPED | OUTPATIENT
Start: 2022-07-21

## 2022-07-21 RX ORDER — ONDANSETRON 2 MG/ML
INJECTION INTRAMUSCULAR; INTRAVENOUS PRN
Status: DISCONTINUED | OUTPATIENT
Start: 2022-07-21 | End: 2022-07-21

## 2022-07-21 RX ORDER — OXYCODONE HYDROCHLORIDE 5 MG/1
5 TABLET ORAL EVERY 4 HOURS PRN
Status: DISCONTINUED | OUTPATIENT
Start: 2022-07-21 | End: 2022-07-22 | Stop reason: HOSPADM

## 2022-07-21 RX ORDER — GLYCOPYRROLATE 0.2 MG/ML
INJECTION, SOLUTION INTRAMUSCULAR; INTRAVENOUS PRN
Status: DISCONTINUED | OUTPATIENT
Start: 2022-07-21 | End: 2022-07-21

## 2022-07-21 RX ORDER — SODIUM CHLORIDE, SODIUM LACTATE, POTASSIUM CHLORIDE, CALCIUM CHLORIDE 600; 310; 30; 20 MG/100ML; MG/100ML; MG/100ML; MG/100ML
INJECTION, SOLUTION INTRAVENOUS CONTINUOUS PRN
Status: DISCONTINUED | OUTPATIENT
Start: 2022-07-21 | End: 2022-07-21

## 2022-07-21 RX ORDER — CEFAZOLIN SODIUM IN 0.9 % NACL 3 G/100 ML
3 INTRAVENOUS SOLUTION, PIGGYBACK (ML) INTRAVENOUS SEE ADMIN INSTRUCTIONS
Status: DISCONTINUED | OUTPATIENT
Start: 2022-07-21 | End: 2022-07-21 | Stop reason: HOSPADM

## 2022-07-21 RX ORDER — FENTANYL CITRATE 50 UG/ML
INJECTION, SOLUTION INTRAMUSCULAR; INTRAVENOUS PRN
Status: DISCONTINUED | OUTPATIENT
Start: 2022-07-21 | End: 2022-07-21

## 2022-07-21 RX ORDER — PROPOFOL 10 MG/ML
INJECTION, EMULSION INTRAVENOUS PRN
Status: DISCONTINUED | OUTPATIENT
Start: 2022-07-21 | End: 2022-07-21

## 2022-07-21 RX ORDER — ONDANSETRON 4 MG/1
4 TABLET, ORALLY DISINTEGRATING ORAL EVERY 30 MIN PRN
Status: DISCONTINUED | OUTPATIENT
Start: 2022-07-21 | End: 2022-07-22 | Stop reason: HOSPADM

## 2022-07-21 RX ORDER — GABAPENTIN 300 MG/1
300 CAPSULE ORAL
Status: COMPLETED | OUTPATIENT
Start: 2022-07-21 | End: 2022-07-21

## 2022-07-21 RX ORDER — HYDROCODONE BITARTRATE AND ACETAMINOPHEN 5; 325 MG/1; MG/1
1-2 TABLET ORAL EVERY 4 HOURS PRN
Qty: 15 TABLET | Refills: 0 | Status: SHIPPED | OUTPATIENT
Start: 2022-07-21 | End: 2023-06-08

## 2022-07-21 RX ORDER — BUPIVACAINE HYDROCHLORIDE 5 MG/ML
INJECTION, SOLUTION PERINEURAL PRN
Status: DISCONTINUED | OUTPATIENT
Start: 2022-07-21 | End: 2022-07-21 | Stop reason: HOSPADM

## 2022-07-21 RX ORDER — KETOROLAC TROMETHAMINE 30 MG/ML
INJECTION, SOLUTION INTRAMUSCULAR; INTRAVENOUS PRN
Status: DISCONTINUED | OUTPATIENT
Start: 2022-07-21 | End: 2022-07-21

## 2022-07-21 RX ORDER — DEXAMETHASONE SODIUM PHOSPHATE 4 MG/ML
INJECTION, SOLUTION INTRA-ARTICULAR; INTRALESIONAL; INTRAMUSCULAR; INTRAVENOUS; SOFT TISSUE PRN
Status: DISCONTINUED | OUTPATIENT
Start: 2022-07-21 | End: 2022-07-21

## 2022-07-21 RX ORDER — FENTANYL CITRATE 50 UG/ML
25 INJECTION, SOLUTION INTRAMUSCULAR; INTRAVENOUS EVERY 5 MIN PRN
Status: DISCONTINUED | OUTPATIENT
Start: 2022-07-21 | End: 2022-07-21 | Stop reason: HOSPADM

## 2022-07-21 RX ORDER — CEFAZOLIN SODIUM IN 0.9 % NACL 3 G/100 ML
3 INTRAVENOUS SOLUTION, PIGGYBACK (ML) INTRAVENOUS
Status: DISCONTINUED | OUTPATIENT
Start: 2022-07-21 | End: 2022-07-21 | Stop reason: HOSPADM

## 2022-07-21 RX ORDER — EPHEDRINE SULFATE 50 MG/ML
INJECTION, SOLUTION INTRAMUSCULAR; INTRAVENOUS; SUBCUTANEOUS PRN
Status: DISCONTINUED | OUTPATIENT
Start: 2022-07-21 | End: 2022-07-21

## 2022-07-21 RX ORDER — MEPERIDINE HYDROCHLORIDE 25 MG/ML
12.5 INJECTION INTRAMUSCULAR; INTRAVENOUS; SUBCUTANEOUS
Status: DISCONTINUED | OUTPATIENT
Start: 2022-07-21 | End: 2022-07-22 | Stop reason: HOSPADM

## 2022-07-21 RX ORDER — SODIUM CHLORIDE, SODIUM LACTATE, POTASSIUM CHLORIDE, CALCIUM CHLORIDE 600; 310; 30; 20 MG/100ML; MG/100ML; MG/100ML; MG/100ML
INJECTION, SOLUTION INTRAVENOUS CONTINUOUS
Status: DISCONTINUED | OUTPATIENT
Start: 2022-07-21 | End: 2022-07-21 | Stop reason: HOSPADM

## 2022-07-21 RX ORDER — SODIUM CHLORIDE, SODIUM LACTATE, POTASSIUM CHLORIDE, CALCIUM CHLORIDE 600; 310; 30; 20 MG/100ML; MG/100ML; MG/100ML; MG/100ML
INJECTION, SOLUTION INTRAVENOUS CONTINUOUS
Status: DISCONTINUED | OUTPATIENT
Start: 2022-07-21 | End: 2022-07-22 | Stop reason: HOSPADM

## 2022-07-21 RX ADMIN — Medication 200 MCG: at 11:31

## 2022-07-21 RX ADMIN — Medication 100 MCG: at 11:09

## 2022-07-21 RX ADMIN — GLYCOPYRROLATE 0.2 MG: 0.2 INJECTION, SOLUTION INTRAMUSCULAR; INTRAVENOUS at 10:42

## 2022-07-21 RX ADMIN — EPHEDRINE SULFATE 10 MG: 50 INJECTION, SOLUTION INTRAMUSCULAR; INTRAVENOUS; SUBCUTANEOUS at 10:43

## 2022-07-21 RX ADMIN — Medication 100 MCG: at 11:00

## 2022-07-21 RX ADMIN — DEXAMETHASONE SODIUM PHOSPHATE 4 MG: 4 INJECTION, SOLUTION INTRA-ARTICULAR; INTRALESIONAL; INTRAMUSCULAR; INTRAVENOUS; SOFT TISSUE at 10:20

## 2022-07-21 RX ADMIN — LIDOCAINE HYDROCHLORIDE 60 MG: 20 INJECTION, SOLUTION INFILTRATION; PERINEURAL at 10:13

## 2022-07-21 RX ADMIN — Medication 3 G: at 10:08

## 2022-07-21 RX ADMIN — ACETAMINOPHEN 975 MG: 325 TABLET ORAL at 08:10

## 2022-07-21 RX ADMIN — GABAPENTIN 300 MG: 300 CAPSULE ORAL at 08:10

## 2022-07-21 RX ADMIN — Medication 0.2 MG: at 11:45

## 2022-07-21 RX ADMIN — Medication 0.3 MG: at 11:35

## 2022-07-21 RX ADMIN — KETOROLAC TROMETHAMINE 30 MG: 30 INJECTION, SOLUTION INTRAMUSCULAR; INTRAVENOUS at 11:23

## 2022-07-21 RX ADMIN — FENTANYL CITRATE 50 MCG: 50 INJECTION, SOLUTION INTRAMUSCULAR; INTRAVENOUS at 10:31

## 2022-07-21 RX ADMIN — OXYCODONE HYDROCHLORIDE 5 MG: 5 TABLET ORAL at 12:35

## 2022-07-21 RX ADMIN — Medication 50 MG: at 10:14

## 2022-07-21 RX ADMIN — FENTANYL CITRATE 50 MCG: 50 INJECTION, SOLUTION INTRAMUSCULAR; INTRAVENOUS at 10:13

## 2022-07-21 RX ADMIN — PROPOFOL 50 MCG/KG/MIN: 10 INJECTION, EMULSION INTRAVENOUS at 10:13

## 2022-07-21 RX ADMIN — PROPOFOL 250 MG: 10 INJECTION, EMULSION INTRAVENOUS at 10:13

## 2022-07-21 RX ADMIN — SODIUM CHLORIDE, SODIUM LACTATE, POTASSIUM CHLORIDE, CALCIUM CHLORIDE: 600; 310; 30; 20 INJECTION, SOLUTION INTRAVENOUS at 10:13

## 2022-07-21 RX ADMIN — Medication 10 MG: at 10:58

## 2022-07-21 RX ADMIN — ONDANSETRON 4 MG: 2 INJECTION INTRAMUSCULAR; INTRAVENOUS at 10:20

## 2022-07-21 RX ADMIN — Medication 100 MCG: at 11:14

## 2022-07-21 ASSESSMENT — ENCOUNTER SYMPTOMS: ORTHOPNEA: 0

## 2022-07-21 ASSESSMENT — LIFESTYLE VARIABLES: TOBACCO_USE: 1

## 2022-07-21 NOTE — DISCHARGE INSTRUCTIONS
[facility]  Home Care Following Anesthesia  For 24 hours after surgery:  Get plenty of rest.  A responsible adult must stay with you for at least 24 hours after you leave the surgery center.  Do not drive or use heavy equipment.  If you have weakness or tingling, don't drive or use heavy equipment until this feeling goes away.   Do not drink alcohol.   Avoid strenuous or risky activities.  Ask for help when climbing stairs.  You may feel lightheaded.  IF so, sit for a few minutes before standing.  Have someone help you get up.   If you have nausea (feel sick to your stomach): Drink only clear liquids such as apple juice, ginger ale, broth or 7-Up.  Rest may also help.  Be sure to drink enough fluids.  Move to a regular diet as you feel able.   You may have a slight fever.  Call the doctor if your fever is over 100 F (37.7 C) (taken under the tongue) or lasts longer than 24 hours.  You may have a dry mouth, a sore throat, muscle aches or trouble sleeping. These should go away after 24 hours.  Do not make important or legal decisions.   It is recommended to avoid smoking.               Tips for taking pain medications  To get the best pain relief possible, remember these points:  Take pain medications as directed, before pain becomes severe.  Pain medication can upset your stomach: taking it with food may help.  Constipation is a common side effect of pain medication. Drink plenty of  fluids.  Eat foods high in fiber. Take a stool softener if recommended by your doctor or pharmacist.  Do not drink alcohol, drive or operate machinery while taking pain medications.  Ask about other ways to control pain, such as with heat, ice or relaxation.    Tylenol/Acetaminophen Consumption  To help encourage the safe use of acetaminophen, the makers of TYLENOL  have lowered the maximum daily dose for single-ingredient Extra Strength TYLENOL  (acetaminophen) products sold in the [place] from 8 pills  per day (4,000 mg) to 6 pills per day (3,000 mg). The dosing interval has also changed from 2 pills every 4-6 hours to 2 pills every 6 hours.  If you feel your pain relief is insufficient, you may take Tylenol/Acetaminophen in addition to your narcotic pain medication.   Be careful not to exceed 3,000 mg of Tylenol/Acetaminophen in a 24 hour period from all sources.  If you are taking extra strength Tylenol/acetaminophen (500 mg), the maximum dose is 6 tablets in 24 hours.  If you are taking regular strength acetaminophen (325 mg), the maximum dose is 9 tablets in 24 hours.  You took 975 mg of tylenol at 8:10am. You can take additional tylenol after 2:10 pm.    Call a doctor for any of the following:  Signs of infection (fever, growing tenderness at the surgery site, a large amount of drainage or bleeding, severe pain, foul-smelling drainage, redness, swelling).  It has been over 8 to 10 hours since surgery and you are still not able to urinate (pass water).  Headache for over 24 hours.  Signs of Covid-19 infection (temperature over 100 degrees, shortness of breath, cough, loss of taste/smell, generalized body aches, persistent headache, chills, sore throat, nausea/vomiting/diarrhea)  Your doctor is:  Dr. Michele Stearns, General Surgery: [phone]                    Or dial [phone] and ask for the resident on call for:  General Surgery  For emergency care, call the:  [facility]:  [phone] (TTY for hearing impaired: [phone])

## 2022-07-21 NOTE — ANESTHESIA POSTPROCEDURE EVALUATION
Patient: Phi Davidson    Procedure: Procedure(s):  HERNIORRHAPHY, BILATERAL INGUINAL, ROBOT-ASSISTED  HERNIORRHAPHY, UMBILICAL, OPEN       Anesthesia Type:  General    Note:  Disposition: Outpatient   Postop Pain Control: Uneventful            Sign Out: Well controlled pain   PONV: No   Neuro/Psych: Uneventful            Sign Out: Acceptable/Baseline neuro status   Airway/Respiratory: Uneventful            Sign Out: Acceptable/Baseline resp. status   CV/Hemodynamics: Uneventful            Sign Out: Acceptable CV status; No obvious hypovolemia; No obvious fluid overload   Other NRE: NONE   DID A NON-ROUTINE EVENT OCCUR? No           Last vitals:  Vitals Value Taken Time   /63 07/21/22 1220   Temp 36.6  C (97.9  F) 07/21/22 1220   Pulse 72 07/21/22 1221   Resp 6 07/21/22 1221   SpO2 91 % 07/21/22 1221   Vitals shown include unvalidated device data.    Electronically Signed By: Moses Hewitt MD  July 21, 2022  1:12 PM

## 2022-07-21 NOTE — ANESTHESIA CARE TRANSFER NOTE
Patient: Phi Davidson    Procedure: Procedure(s):  HERNIORRHAPHY, BILATERAL INGUINAL, ROBOT-ASSISTED  HERNIORRHAPHY, UMBILICAL, OPEN       Diagnosis: Bilateral inguinal hernia without obstruction or gangrene, recurrence not specified [K40.20]  Umbilical hernia without obstruction and without gangrene [K42.9]  Diagnosis Additional Information: No value filed.    Anesthesia Type:   No value filed.     Note:      Level of Consciousness: drowsy  Oxygen Supplementation: face mask            Patient transferred to: PACU    Handoff Report: Identifed the Patient, Identified the Reponsible Provider, Reviewed the pertinent medical history, Discussed the surgical course, Reviewed Intra-OP anesthesia mangement and issues during anesthesia, Set expectations for post-procedure period and Allowed opportunity for questions and acknowledgement of understanding      Vitals:  Vitals Value Taken Time   BP     Temp     Pulse     Resp     SpO2         Electronically Signed By: VICKI Ryan CRNA  July 21, 2022  11:59 AM

## 2022-07-21 NOTE — BRIEF OP NOTE
[facility] Brief Operative Note    Pre-operative diagnosis: Bilateral inguinal hernia without obstruction or gangrene, recurrence not specified [K40.20]  Umbilical hernia without obstruction and without gangrene [K42.9]   Post-operative diagnosis Same as Pre-op Dx   Procedure: Procedure(s):  HERNIORRHAPHY, BILATERAL INGUINAL, ROBOT-ASSISTED  HERNIORRHAPHY, UMBILICAL, OPEN   Surgeon: Michele Stearns MD   Assistants(s):    Estimated blood loss: 1 mL    Specimens: -   Findings: yes

## 2022-07-21 NOTE — INTERVAL H&P NOTE
"I have reviewed the surgical (or preoperative) H&P that is linked to this encounter, and examined the patient. There are no significant changes    Clinical Conditions Present on Arrival:  Clinically Significant Risk Factors Present on Admission                   # Obesity: Estimated body mass index is 34.69 kg/m  as calculated from the following:    Height as of this encounter: 1.93 m (6' 4\").    Weight as of this encounter: 129.3 kg (285 lb).       "

## 2022-07-21 NOTE — OP NOTE
Procedure Date: 07/21/2022    PREOPERATIVE DIAGNOSES:  1.  Bilateral inguinal hernia.  2.  Umbilical hernia.    POSTOPERATIVE DIAGNOSES:  1.  Bilateral inguinal hernia.  2.  Umbilical hernia.    OPERATIVE PROCEDURES:  1.  Laparoscopic bilateral inguinal hernioplasty, robot assisted.  2.  Open mesh repair of umbilical hernia.    SURGEON:  Michele Stearns MD    ANESTHESIA:  General endotracheal.    INDICATIONS FOR PROCEDURE:  The patient presents with bilateral inguinal hernias and umbilical hernia.  Informed consent was obtained.    OPERATIVE FINDINGS:  1.  Bilateral inguinal direct hernias with a fatty cord.  2.  Umbilical hernia, approximately 2 x 3 cm defect.  Refer to below.    DESCRIPTION OF PROCEDURE:  The patient was brought to the operating room, put under general anesthesia.  Abdomen widely prepped and draped in usual sterile fashion.  Supraumbilical skin incision was made.  Dissection carried down to the fascia, and the hernia defect was circumferentially cleared.  The hernia sac was taken off the overlying skin, and a small opening into the peritoneum allowed for placement of Abdias port.  The abdomen was then insufflated.  Eight ports were then placed in left and right lateral per routine and technique.  The meshes were 15 x 10 cm Parietex ProGrip meshes.  These were trimmed and placed in the abdominal cavity, as was the 3-0 Stratafix.  At this point, the robot was docked.  Attention turned to the console, where the peritoneum at the right and the left was opened using the Veress needle that was placed suprapubic per my routine.  The preperitoneal space was then developed to allow for takedown of small direct defects, and the cord was then peritonealized.  The cord was explored bilaterally, noting a fatty cord but there was no cord lipoma.  As such, the meshes were then unrolled to completely cover the myopectineal orifice at the left and the right with the mesh crossing at the midline and carried down  to Guillermo ligament.  Peritoneal defects were closed with running 3-0 Stratafix, and the Veress needle was then opened to atmosphere, which allowed for decompression of the spaces, noting good placement of the mesh.  At this point, the abdomen was deflated.  Ports were removed.  The peritoneal defect at the hernia sac was then closed with 3-0 Vicryl, and the preperitoneal space was then developed under the fascial defect.  I then placed an 8 x 4 cm Parietex ProGrip mesh from the previous mesh that was used, it was the trimmed piece, and this was then tacked in 4 quadrants using 2-0 PDS.  The fascial defect was then closed with a running 0 PDS.  The umbilical skin was tacked down with 3-0 Vicryl.  Skin closed with subcuticular.  Estimated blood loss was minimal.  The patient tolerated the procedure well and was taken to the recovery room, where he was without difficulty or apparent complication.    Michele Stearns MD        D: 2022   T: 2022   MT: kip    Name:     JOHN OQUENDOJames  MRN:      [id number]        Account:        [id number]   :      1960           Procedure Date: 2022     Document: [id number]

## 2022-07-21 NOTE — ANESTHESIA PREPROCEDURE EVALUATION
Anesthesia Pre-Procedure Evaluation    Patient: Phi Davidson   MRN: [id number] : 1960        Procedure : Procedure(s):  HERNIORRHAPHY, BILATERAL INGUINAL, ROBOT-ASSISTED  HERNIORRHAPHY, UMBILICAL, OPEN          Past Medical History:   Diagnosis Date     Chest pain, unspecified 9/10-99    Hospitalized - Chest pain - Non-cardiac     Coronary artery disease      Pure hypercholesterolemia      Tobacco use disorder     Tobacco abuse     Unspecified essential hypertension      Unspecified hearing loss      Unspecified sinusitis (chronic)     Recurrent      Past Surgical History:   Procedure Laterality Date     ANGIOGRAM  2009 @ [facility]    with stent placement - 2.5 x 15mm Xience drug-eluting stent     HC VASECTOMY UNILAT/BILAT W POSTOP SEMEN  9/10/98    Vasectomy      Allergies   Allergen Reactions     Meclizine      Increased dizziness     Sulfa Drugs      hives      Social History     Tobacco Use     Smoking status: Former Smoker     Packs/day: 1.00     Years: 20.00     Pack years: 20.00     Types: Cigarettes     Quit date: 1992     Years since quittin.5     Smokeless tobacco: Never Used     Tobacco comment: no secondhand smoke exposure   Substance Use Topics     Alcohol use: Yes     Comment: approx. 4-5 drinks per weekend      Wt Readings from Last 1 Encounters:   22 129.3 kg (285 lb)        Anesthesia Evaluation   Pt has had prior anesthetic.         ROS/MED HX  ENT/Pulmonary: Comment: Hypersomnia with sleep apnea.     (+) sleep apnea, uses CPAP, tobacco use, Past use,     Neurologic:  - neg neurologic ROS     Cardiovascular:     (+) Dyslipidemia hypertension--CAD --stent-. 1 Drug Eluting Stent. Previous cardiac testing  (-) MAIN and orthopnea/PND   METS/Exercise Tolerance: >4 METS    Hematologic:  - neg hematologic  ROS     Musculoskeletal:  - neg musculoskeletal ROS     GI/Hepatic: Comment: Bilateral Inguinal Hernias    (+) hiatal hernia,     Renal/Genitourinary:  - neg Renal  ROS     Endo:  - neg endo ROS     Psychiatric/Substance Use:  - neg psychiatric ROS     Infectious Disease: Comment: Not vaccinated for covid - neg infectious disease ROS     Malignancy:  - neg malignancy ROS     Other:            Physical Exam    Airway  airway exam normal           Respiratory Devices and Support         Dental  no notable dental history         Cardiovascular   cardiovascular exam normal          Pulmonary   pulmonary exam normal                OUTSIDE LABS:  CBC:   Lab Results   Component Value Date    WBC 4.3 11/15/2012    WBC 4.6 12/28/2011    HGB 14.6 11/15/2012    HGB 14.6 12/28/2011    HCT 41.7 11/15/2012    HCT 42.3 12/28/2011     11/15/2012     12/28/2011     BMP:   Lab Results   Component Value Date     01/30/2014     11/15/2012    POTASSIUM 3.9 01/30/2014    POTASSIUM 3.9 11/15/2012    CHLORIDE 106 01/30/2014    CHLORIDE 107 11/15/2012    CO2 28 01/30/2014    CO2 25 11/15/2012    BUN 19 01/30/2014    BUN 24 11/15/2012    CR 1.05 01/30/2014    CR 1.04 11/15/2012    GLC 97 01/30/2014     (H) 11/15/2012     COAGS:   Lab Results   Component Value Date    INR 1.05 05/06/2009     POC: No results found for: BGM, HCG, HCGS  HEPATIC:   Lab Results   Component Value Date    ALBUMIN 4.3 01/30/2014    PROTTOTAL 7.3 01/30/2014    ALT 55 01/30/2014    AST 37 01/30/2014    ALKPHOS 54 01/30/2014    BILITOTAL 0.7 01/30/2014     OTHER:   Lab Results   Component Value Date    DAMASO 9.3 01/30/2014    TSH 1.33 01/30/2014    CRP <5.0 01/30/2014    SED 5 01/30/2014       Anesthesia Plan    ASA Status:  3   NPO Status:  NPO Appropriate    Anesthesia Type: General.     - Airway: ETT   Induction: Intravenous.   Maintenance: Balanced.        Consents    Anesthesia Plan(s) and associated risks, benefits, and realistic alternatives discussed. Questions answered and patient/representative(s) expressed understanding.    - Discussed:     - Discussed with:  Patient      - Extended  Intubation/Ventilatory Support Discussed: No.      - Patient is DNR/DNI Status: No    Use of blood products discussed: No .     Postoperative Care    Pain management: IV analgesics.   PONV prophylaxis: Ondansetron (or other 5HT-3), Dexamethasone or Solumedrol     Comments:                Moses Hewitt MD

## 2022-07-25 ENCOUNTER — PATIENT OUTREACH (OUTPATIENT)
Dept: SURGERY | Facility: CLINIC | Age: 62
End: 2022-07-25

## 2022-07-25 NOTE — PROGRESS NOTES
RN Post-Op/Post-Discharge Care Coordination Note    Mr. Phi Davidson is a 62 year old male who underwent robotic inguinal hernia repair on 7/21 with  Dr. Michele Stearns.  Spoke with Patient.    Support  Patient able to care for self independently     Health Status  Nausea/Vomiting: Patient denies nausea/vomiting.  Eating/drinking: Patient is able to eat and drink without any complaints.  Bowel habits: Patient reports having a normal bowel movement. Patient states since surgery he has had to get up 2-3 times during the night to urinate. This is not usual for him. Urine is clear and does not have an odor. No fevers or chills. No dysuria. Advised to drink plenty of fluids throughout the day and limit fluid intake starting at 6pm. Advised to return call if he develops malodorous urine, cloudy urine, dysuria, fevers or chills.   Drains (LAM): N/A  Fevers/chills: Patient denies any fever or chills.  Incisions: Patient denies any signs and symptoms of infection. Wound closure:  Steri-strips  Pain: tolerable, just using Tylenol and ibuprofen per package instructions  New Medications: Norco, Senna    Activity/Restrictions  No lifting in excess of 15-20 pounds for 3-4 weeks    Equipment  Athletic Supporter    Pathology reviewed with patient:  N/A    Forms/Letters  No    All of his questions were answered including reviewing restrictions and wound care.  He will call this office if he has any further questions and/or concerns.      In lieu of a post-op clinic patient that patient would like to be contacted in approximately 7-10 days via telephone.    A copy of this note was routed to the primary surgeon.      Whom and When to Call  Patient acknowledges understanding of how to manage any medication changes and when to seek medical care.     Patient advised that if after hour medical concerns arise to please call [phone] and choose option 4 to speak to the physician on call.

## 2022-08-02 ENCOUNTER — PATIENT OUTREACH (OUTPATIENT)
Dept: SURGERY | Facility: CLINIC | Age: 62
End: 2022-08-02

## 2022-08-02 NOTE — PROGRESS NOTES
Phi Davidson was contacted several days post procedure via telephone for a status update and elected to have a telephone follow-up call approximately 7-10 days after original contact in lieu of a clinic visit with Dr. Michele Stearns.  He continues to do well and the steri-strips  have peeled off.  The patients wounds are healed and the area is C/D/I.  his is afebrile, pain free, and karine po; the patient is slowly resuming his normal activity. Discussed restrictions including no lifting in excess of 15 pounds for 2 more weeks.    Patient's night time urinary frequency has somewhat improved but hasn't completely resolved. Still no concerns for bladder infection at this time and doesn't appear to have the issue during the day. States he hasn't had to get up in the night that last couple of nights. Dr. Stearns states this issue can happen after hernia surgery and should improve over time. If not improvement patient should see his PCP for a prostate check.    Pathology was reviewed with the patient: N/A    All of Phi Davidson question's were answered and  he would like to return to the clinic on a PRN basis.  The patient is aware that  he may schedule a post op appointment at any time. He would like this writer to call him in another week to check in.     A copy of this note was routed to the patients surgeon.

## 2022-08-09 ENCOUNTER — PATIENT OUTREACH (OUTPATIENT)
Dept: SURGERY | Facility: CLINIC | Age: 62
End: 2022-08-09

## 2022-08-09 NOTE — PROGRESS NOTES
Patient requested this writer call him back in one week from 8/2/22 for another status update post robotic inguinal herniorrhaphy. He states that he is doing well. The urinary frequency continues to improve. His only concern is that prior to surgery he had a pressure feeling in his left groin that he thought would improve after surgery but so far it has not. He is wondering what could be causing this. This writer advised to try heat applications for 20min intervals 3-4x/day to see if that helps improve this feeling. It could be a seroma in which case the heat applications may help. He is going to try this for the next couple of weeks and if he does not have improvement he will call the [facility] back to schedule an appointment with Dr. Stearns. All questions were answered.

## 2022-10-13 ENCOUNTER — HOSPITAL ENCOUNTER (OUTPATIENT)
Dept: MRI IMAGING | Facility: OTHER | Age: 62
Discharge: HOME | End: 2022-10-13
Attending: CHIROPRACTOR | Admitting: CHIROPRACTOR
Payer: COMMERCIAL

## 2022-10-13 DIAGNOSIS — M54.50 ACUTE LEFT-SIDED LOW BACK PAIN: ICD-10-CM

## 2022-10-13 PROCEDURE — 72148 MRI LUMBAR SPINE W/O DYE: CPT

## 2022-11-19 ENCOUNTER — HEALTH MAINTENANCE LETTER (OUTPATIENT)
Age: 62
End: 2022-11-19

## 2023-06-01 ENCOUNTER — HEALTH MAINTENANCE LETTER (OUTPATIENT)
Age: 63
End: 2023-06-01

## 2023-06-08 ENCOUNTER — OFFICE VISIT (OUTPATIENT)
Dept: FAMILY MEDICINE | Facility: OTHER | Age: 63
End: 2023-06-08
Attending: NURSE PRACTITIONER
Payer: MEDICARE

## 2023-06-08 ENCOUNTER — HOSPITAL ENCOUNTER (OUTPATIENT)
Dept: GENERAL RADIOLOGY | Facility: OTHER | Age: 63
Discharge: HOME | End: 2023-06-08
Attending: NURSE PRACTITIONER
Payer: MEDICARE

## 2023-06-08 VITALS
DIASTOLIC BLOOD PRESSURE: 78 MMHG | OXYGEN SATURATION: 96 % | BODY MASS INDEX: 35.45 KG/M2 | RESPIRATION RATE: 18 BRPM | TEMPERATURE: 98.3 F | HEART RATE: 76 BPM | SYSTOLIC BLOOD PRESSURE: 130 MMHG | HEIGHT: 76 IN | WEIGHT: 291.1 LBS

## 2023-06-08 DIAGNOSIS — G47.33 OBSTRUCTIVE SLEEP APNEA: ICD-10-CM

## 2023-06-08 DIAGNOSIS — M25.551 HIP PAIN, RIGHT: ICD-10-CM

## 2023-06-08 DIAGNOSIS — I10 ESSENTIAL HYPERTENSION, BENIGN: ICD-10-CM

## 2023-06-08 DIAGNOSIS — E66.01 MORBID OBESITY (H): ICD-10-CM

## 2023-06-08 DIAGNOSIS — Z01.818 PREOP GENERAL PHYSICAL EXAM: Primary | ICD-10-CM

## 2023-06-08 DIAGNOSIS — I25.10 CORONARY ARTERY DISEASE INVOLVING NATIVE HEART WITHOUT ANGINA PECTORIS, UNSPECIFIED VESSEL OR LESION TYPE: ICD-10-CM

## 2023-06-08 DIAGNOSIS — H81.10 BENIGN PAROXYSMAL POSITIONAL VERTIGO, UNSPECIFIED LATERALITY: ICD-10-CM

## 2023-06-08 LAB
ANION GAP SERPL CALCULATED.3IONS-SCNC: 9 MMOL/L (ref 7–15)
BUN SERPL-MCNC: 21.2 MG/DL (ref 8–23)
CALCIUM SERPL-MCNC: 9.5 MG/DL (ref 8.8–10.2)
CHLORIDE SERPL-SCNC: 104 MMOL/L (ref 98–107)
CREAT SERPL-MCNC: 1.06 MG/DL (ref 0.67–1.17)
DEPRECATED HCO3 PLAS-SCNC: 26 MMOL/L (ref 22–29)
GFR SERPL CREATININE-BSD FRML MDRD: 78 ML/MIN/1.73M2
GLUCOSE SERPL-MCNC: 86 MG/DL (ref 70–99)
HGB BLD-MCNC: 15.8 G/DL (ref 13.3–17.7)
POTASSIUM SERPL-SCNC: 4.5 MMOL/L (ref 3.4–5.3)
SODIUM SERPL-SCNC: 139 MMOL/L (ref 136–145)

## 2023-06-08 PROCEDURE — 93010 ELECTROCARDIOGRAM REPORT: CPT | Performed by: INTERNAL MEDICINE

## 2023-06-08 PROCEDURE — 99203 OFFICE O/P NEW LOW 30 MIN: CPT | Performed by: NURSE PRACTITIONER

## 2023-06-08 PROCEDURE — 82310 ASSAY OF CALCIUM: CPT | Mod: ZL | Performed by: NURSE PRACTITIONER

## 2023-06-08 PROCEDURE — G0463 HOSPITAL OUTPT CLINIC VISIT: HCPCS

## 2023-06-08 PROCEDURE — 85018 HEMOGLOBIN: CPT | Mod: ZL | Performed by: NURSE PRACTITIONER

## 2023-06-08 PROCEDURE — 93005 ELECTROCARDIOGRAM TRACING: CPT | Performed by: NURSE PRACTITIONER

## 2023-06-08 PROCEDURE — 73502 X-RAY EXAM HIP UNI 2-3 VIEWS: CPT

## 2023-06-08 PROCEDURE — 36415 COLL VENOUS BLD VENIPUNCTURE: CPT | Mod: ZL | Performed by: NURSE PRACTITIONER

## 2023-06-08 RX ORDER — EZETIMIBE 10 MG/1
10 TABLET ORAL DAILY
COMMUNITY
Start: 2023-05-16

## 2023-06-08 ASSESSMENT — PAIN SCALES - GENERAL: PAINLEVEL: SEVERE PAIN (6)

## 2023-06-08 NOTE — PROGRESS NOTES
[facility] AND [facility]  [address]  [place] [address]  Phone: [phone]  Fax: [phone]  Primary Provider: Gerald Stone  Pre-op Performing Provider: VIKTORIA PHILLIP      PREOPERATIVE EVALUATION:  Today's date: 6/8/2023    Phi Davidson is a 63 year old male who presents for a preoperative evaluation.  Surgical Information:  Surgery/Procedure: lt knee meniscus repain  Surgery Location: [facility]  Surgeon: Dr. Wallace  Surgery Date: 06/15/23  Time of Surgery:    Where patient plans to recover: At home with family  Fax number for surgical facility: [phone]    Assessment & Plan     The proposed surgical procedure is considered INTERMEDIATE risk.    Problem List Items Addressed This Visit        Respiratory    Obstructive sleep apnea       Digestive    Morbid obesity (H)       Circulatory    Essential hypertension, benign    CAD (coronary artery disease)   Other Visit Diagnoses     Preop general physical exam    -  Primary    Hip pain, right            Optimized for upcoming procedure as requested.  X-ray of right hip was normal, referred to physical therapy  Referred to physical therapy for BPPV  Continue with current management of other health conditions     - No identified additional risk factors other than previously addressed    Antiplatelet or Anticoagulation Medication Instructions:   - aspirin: Discontinue aspirin 7-10 days prior to procedure to reduce bleeding risk. It should be resumed postoperatively.     Additional Medication Instructions:   - ACE/ARB: Continue without modification (e.g., MAC anesthesia, neurosurgery, spine surgery, heart failure, or labile hypertension with risk of hypertension).   - Beta Blockers: Continue taking on the day of surgery.   - Statins: Continue taking on the day of surgery.     RECOMMENDATION:  APPROVAL GIVEN to proceed with proposed procedure, without further diagnostic evaluation.    Review of the result(s) of each  unique test - Hemoglobin, BMP, EKG  Ordering of each unique test  26 minutes spent by me on the date of the encounter doing chart review, history and exam, documentation and further activities per the note      Subjective       HPI related to upcoming procedure: He comes in today for preoperative clearance for left knee meniscal injury.  He has been dealing with this for couple months and excited to get this repaired.  Around this time he also started having pain to his right hip, he is a popping sensation, painful and feels like it is giving out at times.  He knows he is walking differently due to the meniscal injury.  He is wondering if he can have further evaluation of this hip and possible referral to physical therapy.  He also has frequent BPPV, has been working on this at home without success and would like to have therapy referral.        6/7/2023    10:01 PM   Preop Questions   1. Have you ever had a heart attack or stroke? No   2. Have you ever had surgery on your heart or blood vessels, such as a stent placement, a coronary artery bypass, or surgery on an artery in your head, neck, heart, or legs? YES - stent 2012   3. Do you have chest pain with activity? No   4. Do you have a history of  heart failure? No   5. Do you currently have a cold, bronchitis or symptoms of other infection? No   6. Do you have a cough, shortness of breath, or wheezing? No   7. Do you or anyone in your family have previous history of blood clots? No   8. Do you or does anyone in your family have a serious bleeding problem such as prolonged bleeding following surgeries or cuts? No   9. Have you ever had problems with anemia or been told to take iron pills? No   10. Have you had any abnormal blood loss such as black, tarry or bloody stools? No   11. Have you ever had a blood transfusion? No   12. Are you willing to have a blood transfusion if it is medically needed before, during, or after your surgery? Yes   13. Have you or any of  your relatives ever had problems with anesthesia? No   14. Do you have sleep apnea, excessive snoring or daytime drowsiness? YES - uses CPAP   14a. Do you have a CPAP machine? Yes   15. Do you have any artifical heart valves or other implanted medical devices like a pacemaker, defibrillator, or continuous glucose monitor? No   16. Do you have artificial joints? No   17. Are you allergic to latex? No       Health Care Directive:  Patient does not have a Health Care Directive or Living Will: Patient states has Advance Directive and will bring in a copy to clinic.    Preoperative Review of :   reviewed - no record of controlled substances prescribed.      Status of Chronic Conditions:  See problem list for active medical problems.  Problems all longstanding and stable, except as noted/documented.  See ROS for pertinent symptoms related to these conditions.      Review of Systems  CONSTITUTIONAL: NEGATIVE for fever, chills, change in weight  INTEGUMENTARY/SKIN: NEGATIVE for worrisome rashes, moles or lesions  EYES: NEGATIVE for vision changes or irritation  ENT/MOUTH: NEGATIVE for ear, mouth and throat problems  RESP: NEGATIVE for significant cough or SOB  CV: NEGATIVE for chest pain, palpitations or peripheral edema  GI: NEGATIVE for nausea, abdominal pain, heartburn, or change in bowel habits  : NEGATIVE for frequency, dysuria, or hematuria  MUSCULOSKELETAL:right hip feels like popping out of joint, left knee pain/swelling  NEURO: NEGATIVE for weakness, dizziness or paresthesias  ENDOCRINE: NEGATIVE for temperature intolerance, skin/hair changes  HEME: NEGATIVE for bleeding problems  PSYCHIATRIC: NEGATIVE for changes in mood or affect    Patient Active Problem List    Diagnosis Date Noted     Bilateral inguinal hernia without obstruction or gangrene, recurrence not specified 06/28/2022     Priority: Medium     Added automatically from request for surgery [id number]       Umbilical hernia without obstruction and  without gangrene 06/28/2022     Priority: Medium     Added automatically from request for surgery [id number]       CAD (coronary artery disease) 05/13/2009     Priority: Medium     5/2009:  [facility] angiogram with 2 vessel disease with 70% disease in a small vessel apical LAD not requiring intervention.  Severe 80% lesion in posterior descending right coronary artery treated with successful angioplasty/stent and 2 years of Plavix recommended.        Obstructive sleep apnea 08/02/2008     Priority: Medium     Hyperlipidemia 09/14/2003     Priority: Medium     Problem list name updated by automated process. Provider to review       Hypersomnia with sleep apnea 09/10/2003     Priority: Medium     Problem list name updated by automated process. Provider to review       Essential hypertension, benign      Priority: Medium      Past Medical History:   Diagnosis Date     Chest pain, unspecified 9/10-9/11/99    Hospitalized - Chest pain - Non-cardiac     Coronary artery disease      Pure hypercholesterolemia      Tobacco use disorder     Tobacco abuse     Unspecified essential hypertension      Unspecified hearing loss      Unspecified sinusitis (chronic)     Recurrent     Past Surgical History:   Procedure Laterality Date     ANGIOGRAM  5/6/2009 @ [facility]    with stent placement - 2.5 x 15mm Xience drug-eluting stent     DAVINCI HERNIORRHAPHY INGUINAL Bilateral 7/21/2022    Procedure: HERNIORRHAPHY, BILATERAL INGUINAL, ROBOT-ASSISTED;  Surgeon: Michele Stearns MD;  [place] [facility] [place]      VASECTOMY UNILAT/BILAT W POSTOP SEMEN  9/10/98    Vasectomy     HERNIORRHAPHY UMBILICAL N/A 7/21/2022    Procedure: HERNIORRHAPHY, UMBILICAL, OPEN;  Surgeon: Michele Stearns MD;  [place] [facility] [place]     Current Outpatient Medications   Medication Sig Dispense Refill     ASPIRIN 81 MG OR TABS Take by mouth every morning 100 3     atenolol (TENORMIN) 50 MG tablet Take 1 tablet (50 mg) by mouth daily (Patient taking differently: Take 50  mg by mouth every morning) 90 tablet 3     azelastine (ASTELIN) 137 MCG/SPRAY nasal spray Spray 1-2 sprays into both nostrils 2 times daily (Patient taking differently: Spray 1-2 sprays into both nostrils as needed) 3 Bottle 3     fluticasone (FLONASE) 50 MCG/ACT nasal spray Spray 1-2 sprays into both nostrils daily (Patient taking differently: Spray 1-2 sprays into both nostrils daily as needed) 1 Package 11     lisinopril (PRINIVIL,ZESTRIL) 5 MG tablet Take 1 tablet (5 mg) by mouth daily (Patient taking differently: Take 5 mg by mouth every morning) 90 tablet 3     ORDER FOR DME PRESCRIPTION FOR: replacement CPAP and tubing/mask as appropriate to continue at current settings 1 0     ORDER FOR DME cpap  0     testosterone (ANDROGEL 1% PUMP) 12.5 MG/ACT (1%) gel Apply 4 pumps (5 g of gel) from dispenser daily to clean, dry, intact skin of the shoulders, upper arms, or abdomen. (Patient taking differently: every morning Apply 4 pumps (5 g of gel) from dispenser daily to clean, dry, intact skin of the shoulders, upper arms, or abdomen.) 1 Month 11     traZODone (DESYREL) 150 MG tablet Take 0.5 tablets (75 mg) by mouth At Bedtime 45 tablet 3     HYDROcodone-acetaminophen (NORCO) 5-325 MG tablet Take 1-2 tablets by mouth every 4 hours as needed for moderate to severe pain 15 tablet 0     IBUPROFEN  MG OR TABS every 4 hours as needed       pravastatin (PRAVACHOL) 40 MG tablet Take 1 tablet (40 mg) by mouth daily (Patient not taking: Reported on 6/8/2023) 90 tablet 3     rosuvastatin (CRESTOR) 20 MG tablet Take 1 tablet by mouth every evening       senna-docusate (SENOKOT-S/PERICOLACE) 8.6-50 MG tablet Take 1-2 tablets by mouth 2 times daily 30 tablet 0       Allergies   Allergen Reactions     Meclizine      Increased dizziness     Sulfa Antibiotics      hives        Social History     Tobacco Use     Smoking status: Former     Packs/day: 1.00     Years: 20.00     Pack years: 20.00     Types: Cigarettes     Quit  "date: 1992     Years since quittin.4     Smokeless tobacco: Never     Tobacco comments:     no secondhand smoke exposure   Vaping Use     Vaping status: Never Used   Substance Use Topics     Alcohol use: Yes     Comment: approx. 4-5 drinks per weekend     Family History   Problem Relation Age of Onset     Diabetes Other         Positive family history     Hypertension Other         Positive family history     Lipids Other         Positive family history     Heart Disease Father         MI/ at 57 of MI     Cancer Father         lymph node, near ear/lymph node     Lipids Father      Diabetes Mother      Hypertension Mother      Eye Disorder Mother         cataract     Hypertension Sister      Hypertension Brother      Cerebrovascular Disease No family hx of      Breast Cancer No family hx of      Cancer - colorectal No family hx of      Prostate Cancer No family hx of      Thyroid Disease No family hx of      Asthma No family hx of      C.A.D. No family hx of      Respiratory Maternal Grandfather         TB     History   Drug Use No         Objective     /78   Pulse 76   Temp 98.3  F (36.8  C)   Resp 18   Ht 1.93 m (6' 4\")   Wt 132 kg (291 lb 1.6 oz)   SpO2 96%   BMI 35.43 kg/m      Physical Exam    GENERAL APPEARANCE: healthy, alert and no distress     EYES: EOMI,  PERRL     HENT: ear canals and TM's normal and nose and mouth without ulcers or lesions     NECK: no adenopathy, no asymmetry, masses, or scars and thyroid normal to palpation     RESP: lungs clear to auscultation - no rales, rhonchi or wheezes     CV: regular rates and rhythm, normal S1 S2, no S3 or S4 and no murmur, click or rub     ABDOMEN:  soft, nontender, no HSM or masses and bowel sounds normal     MS: extremities normal- no gross deformities noted, no evidence of inflammation in joints, FROM in all extremities.     SKIN: no suspicious lesions or rashes     NEURO: Normal strength and tone, sensory exam grossly normal, " mentation intact and speech normal     PSYCH: mentation appears normal. and affect normal/bright     LYMPHATICS: No cervical adenopathy    No results for input(s): HGB, PLT, INR, NA, POTASSIUM, CR, A1C in the last [id number] hours.     Diagnostics:  Recent Results (from the past 24 hour(s))   EKG 12-lead, tracing only    Collection Time: 06/08/23 11:30 AM   Result Value Ref Range    Systolic Blood Pressure  mmHg    Diastolic Blood Pressure  mmHg    Ventricular Rate 68 BPM    Atrial Rate 68 BPM    KS Interval 156 ms    QRS Duration 96 ms     ms    QTc 399 ms    P Axis 16 degrees    R AXIS -22 degrees    T Axis -4 degrees    Interpretation ECG       Sinus rhythm  Normal ECG  When compared with ECG of 07-MAY-2009 07:05,  No significant change was found     Hemoglobin    Collection Time: 06/08/23 11:38 AM   Result Value Ref Range    Hemoglobin 15.8 13.3 - 17.7 g/dL   Basic Metabolic Panel    Collection Time: 06/08/23 11:38 AM   Result Value Ref Range    Sodium 139 136 - 145 mmol/L    Potassium 4.5 3.4 - 5.3 mmol/L    Chloride 104 98 - 107 mmol/L    Carbon Dioxide (CO2) 26 22 - 29 mmol/L    Anion Gap 9 7 - 15 mmol/L    Urea Nitrogen 21.2 8.0 - 23.0 mg/dL    Creatinine 1.06 0.67 - 1.17 mg/dL    Calcium 9.5 8.8 - 10.2 mg/dL    Glucose 86 70 - 99 mg/dL    GFR Estimate 78 >60 mL/min/1.73m2      EKG: appears normal, NSR, normal axis, normal intervals, no acute ST/T changes c/w ischemia, no LVH by voltage criteria, unchanged from previous tracings    Revised Cardiac Risk Index (RCRI):  The patient has the following serious cardiovascular risks for perioperative complications:   - Coronary Artery Disease (MI, positive stress test, angina, Qs on EKG) = 1 point     RCRI Interpretation: 1 point: Class II (low risk - 0.9% complication rate)           Signed Electronically by: VICKI Daniels CNP  Copy of this evaluation report is provided to requesting physician.

## 2023-06-08 NOTE — NURSING NOTE
Patient presents today for pre op exam.    Medication Reconciliation Complete    Carrie Javier LPN  6/8/2023 10:50 AM

## 2023-06-08 NOTE — PATIENT INSTRUCTIONS
For informational purposes only. Not to replace the advice of your health care provider. Copyright   2003,  [facility]. All rights reserved. Clinically reviewed by Maryanne Rocha MD. [employer] [id number] - REV .  Preparing for Your Surgery  Getting started  A nurse will call you to review your health history and instructions. They will give you an arrival time based on your scheduled surgery time. Please be ready to share:  Your doctor's clinic name and phone number  Your medical, surgical, and anesthesia history  A list of allergies and sensitivities  A list of medicines, including herbal treatments and over-the-counter drugs  Whether the patient has a legal guardian (ask how to send us the papers in advance)  Please tell us if you're pregnant--or if there's any chance you might be pregnant. Some surgeries may injure a fetus (unborn baby), so they require a pregnancy test. Surgeries that are safe for a fetus don't always need a test, and you can choose whether to have one.   If you have a child who's having surgery, please ask for a copy of Preparing for Your Child's Surgery.    Preparing for surgery  Within 10 to 30 days of surgery: Have a pre-op exam (sometimes called an H&P, or History and Physical). This can be done at a clinic or pre-operative center.  If you're having a , you may not need this exam. Talk to your care team.  At your pre-op exam, talk to your care team about all medicines you take. If you need to stop any medicines before surgery, ask when to start taking them again.  We do this for your safety. Many medicines can make you bleed too much during surgery. Some change how well surgery (anesthesia) drugs work.  Call your insurance company to let them know you're having surgery. (If you don't have insurance, call [phone].)  Call your clinic if there's any change in your health. This includes signs of a cold or flu (sore throat, runny nose, cough, rash, fever). It  also includes a scrape or scratch near the surgery site.  If you have questions on the day of surgery, call your hospital or surgery center.  Eating and drinking guidelines  For your safety: Unless your surgeon tells you otherwise, follow the guidelines below.  Eat and drink as usual until 8 hours before you arrive for surgery. After that, no food or milk.  Drink clear liquids until 2 hours before you arrive. These are liquids you can see through, like water, Gatorade, and Propel Water. They also include plain black coffee and tea (no cream or milk), candy, and breath mints. You can spit out gum when you arrive.  If you drink alcohol: Stop drinking it the night before surgery.  If your care team tells you to take medicine on the morning of surgery, it's okay to take it with a sip of water.  Preventing infection  Shower or bathe the night before and morning of your surgery. Follow the instructions your clinic gave you. (If no instructions, use regular soap.)  Don't shave or clip hair near your surgery site. We'll remove the hair if needed.  Don't smoke or vape the morning of surgery. You may chew nicotine gum up to 2 hours before surgery. A nicotine patch is okay.  Note: Some surgeries require you to completely quit smoking and nicotine. Check with your surgeon.  Your care team will make every effort to keep you safe from infection. We will:  Clean our hands often with soap and water (or an alcohol-based hand rub).  Clean the skin at your surgery site with a special soap that kills germs.  Give you a special gown to keep you warm. (Cold raises the risk of infection.)  Wear special hair covers, masks, gowns and gloves during surgery.  Give antibiotic medicine, if prescribed. Not all surgeries need antibiotics.  What to bring on the day of surgery  Photo ID and insurance card  Copy of your health care directive, if you have one  Glasses and hearing aids (bring cases)  You can't wear contacts during surgery  Inhaler and  eye drops, if you use them (tell us about these when you arrive)  CPAP machine or breathing device, if you use them  A few personal items, if spending the night  If you have . . .  A pacemaker, ICD (cardiac defibrillator) or other implant: Bring the ID card.  An implanted stimulator: Bring the remote control.  A legal guardian: Bring a copy of the certified (court-stamped) guardianship papers.  Please remove any jewelry, including body piercings. Leave jewelry and other valuables at home.  If you're going home the day of surgery  You must have a responsible adult drive you home. They should stay with you overnight as well.  If you don't have someone to stay with you, and you aren't safe to go home alone, we may keep you overnight. Insurance often won't pay for this.  After surgery  If it's hard to control your pain or you need more pain medicine, please call your surgeon's office.  Questions?   If you have any questions for your care team, list them here: _________________________________________________________________________________________________________________________________________________________________________ ____________________________________ ____________________________________ ____________________________________    How to Take Your Medication Before Surgery  - Take all of your medications before surgery except as noted below  - HOLD (do not take) Aspirin for 7-10 days    Avoid naproxen/ibuprofen for the week before surgery as well     unknown

## 2023-06-11 LAB
ATRIAL RATE - MUSE: 68 BPM
DIASTOLIC BLOOD PRESSURE - MUSE: NORMAL MMHG
INTERPRETATION ECG - MUSE: NORMAL
P AXIS - MUSE: 16 DEGREES
PR INTERVAL - MUSE: 156 MS
QRS DURATION - MUSE: 96 MS
QT - MUSE: 376 MS
QTC - MUSE: 399 MS
R AXIS - MUSE: -22 DEGREES
SYSTOLIC BLOOD PRESSURE - MUSE: NORMAL MMHG
T AXIS - MUSE: -4 DEGREES
VENTRICULAR RATE- MUSE: 68 BPM

## 2023-06-22 ENCOUNTER — THERAPY VISIT (OUTPATIENT)
Dept: PHYSICAL THERAPY | Facility: OTHER | Age: 63
End: 2023-06-22
Attending: NURSE PRACTITIONER
Payer: MEDICARE

## 2023-06-22 DIAGNOSIS — H81.10 BENIGN PAROXYSMAL POSITIONAL VERTIGO, UNSPECIFIED LATERALITY: ICD-10-CM

## 2023-06-22 DIAGNOSIS — M25.551 HIP PAIN, RIGHT: ICD-10-CM

## 2023-06-22 PROCEDURE — 97110 THERAPEUTIC EXERCISES: CPT | Mod: GP

## 2023-06-22 PROCEDURE — 97161 PT EVAL LOW COMPLEX 20 MIN: CPT | Mod: GP

## 2023-06-22 NOTE — PROGRESS NOTES
PHYSICAL THERAPY EVALUATION  Type of Visit: Evaluation    See electronic medical record for Abuse and Falls Screening details.    Subjective      Presenting condition or subjective complaint: Pt is a 63 year old male referred to skilled PT services following an increase in R hip pain that started about 2 months ago after a L knee torn meniscus and just had a surgery for the meniscus and feels like his hip is due to walking off due to L knee. Pt reports he will stand up or start walking and feels like his hip is going to pop out of place or give out suddenly. Pt reports he has to be very careful with walking. Pt reports his hip does not bother his more sleeping on that side. Pt reports the worse pain is just when first getting up from a chair or car, also when getting up from bed. Pt reports he did walk with a pair of crutches for a little while due to L knee.  Date of onset: 04/12/23    Relevant medical history:   broken bones, heart problems, HTN, smoking, hernia surgery, 6/15/23 L meniscus surgery  Dates & types of surgery: Hernia surgery 2022    Prior diagnostic imaging/testing results: X-ray   no noted deficits  Prior therapy history for the same diagnosis, illness or injury: No      Prior Level of Function   Transfers: Independent  Ambulation: Independent  ADL: Independent  IADL: Driving, Medication management, yard work (unstable surfaces), stairs are difficult    Living Environment  Social support: With a significant other or spouse   Type of home: House   Stairs to enter the home: Yes 13 Is there a railing: Yes   Ramp: No   Stairs inside the home: Yes 12 Is there a railing: Yes   Help at home: None  Equipment owned: Crutches     Employment: No    Hobbies/Interests:   outdoor activites    Patient goals for therapy: Walk normally    Pain assessment: See objective evaluation for additional pain details     Objective   HIP EVALUATION  PAIN: Pain Level at Rest: 0/10  Pain Level with Use: 7/10  Pain Location:  hip  Pain Quality: Burning and Sharp  Pain Frequency: intermittent  Pain is Exacerbated By: walking, sitting for long periods, driving  Pain is Relieved By: stretch and use  INTEGUMENTARY (edema, incisions): WNL  POSTURE: WNL  GAIT:   Weightbearing Status: WBAT  Assistive Device(s): None  Gait Deviations: Antalgic  ROM: R supine SLR at 60 deg, 100 deg hip flexion, 10 deg hip ext  PELVIC/SI SCREEN: WNL  STRENGTH: B hip flexion 4+/5, B hip ABD/ADD 5/5, R knee flexion/ext 5/5, R ankle dorsiflexion 5/5  LE FLEXIBILITY: limited hamstring and sciatic nerve motion on the R  SPECIAL TESTS:    Left Right   SATURNINO Negative  Negative    FADIR/Labrum/VASQUEZ Negative  Negative    Femoral Nerve Negative  Negative    Kedar's Negative  Negative    Piriformis Negative  Positive   Quadrant Testing Negative  Negative    SLR Negative  Positive   Slump Negative  Positive   Stork with Extension Negative  Negative    Gerson Negative  Positive          FUNCTIONAL TESTS: forward bend in standing with significant limitation in trunk flexion when not able to bend knees, B side bending WNL  PALPATION: high tissue tension in the R gluts, IT band, hamstring, and distal lat dorsi  JOINT MOBILITY: decreased inferior and anterior joint motion    Assessment & Plan   CLINICAL IMPRESSIONS   Medical Diagnosis: R hip pain, BPPV    Treatment Diagnosis: R hip stiffness, vertigo   Impression/Assessment: Patient is a 63 year old male with stiffness, pain, dizziness, and R hip instability complaints.  The following significant findings have been identified: Pain, Decreased ROM/flexibility, Decreased joint mobility and Dizziness. These impairments interfere with their ability to perform recreational activities, household chores, driving , household mobility and community mobility as compared to previous level of function.     Clinical Decision Making (Complexity):   Clinical Presentation: Stable/Uncomplicated  Clinical Presentation Rationale: based on medical and  personal factors listed in PT evaluation  Clinical Decision Making (Complexity): Low complexity    PLAN OF CARE  Treatment Interventions:  Modalities: Cryotherapy, Hot Pack, Mechanical Traction, Ultrasound, Vasoneumatic Device ketoprofen 10% with phonophoresis  Interventions: Gait Training, Manual Therapy, Neuromuscular Re-education, Therapeutic Activity, Therapeutic Exercise, Aquatic Therapy    Long Term Goals     PT Goal 1  Goal Identifier: Pain  Goal Description: pt will report an average of 2/10 pain or less with intial standing after sleeping for 4 days in a row to increase gait stability in the morning.  Target Date: 08/03/23  PT Goal 2  Goal Identifier: ROM  Goal Description: Pt will demonstrate 80 deg of supine R SLR to increase ROM for dressing and normalized gait mechanics  Target Date: 08/17/23  PT Goal 3  Goal Identifier: Gait stability  Goal Description: Pt will report 1 week free of his R hip feeling unstable and giving out in order to increase stability for walking in his home and outdoors.  Target Date: 08/17/23      Frequency of Treatment: 2x/week  Duration of Treatment: 8 weeks    Education Assessment:   Learner/Method: Patient    Risks and benefits of evaluation/treatment have been explained.   Patient/Family/caregiver agrees with Plan of Care.     Evaluation Time:     PT Eval, Low Complexity Minutes (40346): 15    Signing Clinician: Barbie Parada, PT      [facility]                                                                                   OUTPATIENT PHYSICAL THERAPY      PLAN OF TREATMENT FOR OUTPATIENT REHABILITATION   Patient's Last Name, First Name, Phi Noe YOB: 1960   Provider's Name   [facility]   Medical Record No.  [id number]     Onset Date: 04/12/23  Start of Care Date: 06/22/23     Medical Diagnosis:  R hip pain, BPPV      PT Treatment Diagnosis:  R hip stiffness, vertigo Plan of  Treatment  Frequency/Duration: 2x/week/ 8 weeks    Certification date from 06/22/23 to 08/17/23         See note for plan of treatment details and functional goals     Barbie Parada, PT                         I CERTIFY THE NEED FOR THESE SERVICES FURNISHED UNDER        THIS PLAN OF TREATMENT AND WHILE UNDER MY CARE     (Physician attestation of this document indicates review and certification of the therapy plan).                  Referring Provider:  Nga Gautam      Initial Assessment  See Epic Evaluation- Start of Care Date: 06/22/23

## 2023-06-27 ENCOUNTER — THERAPY VISIT (OUTPATIENT)
Dept: PHYSICAL THERAPY | Facility: OTHER | Age: 63
End: 2023-06-27
Attending: NURSE PRACTITIONER
Payer: MEDICARE

## 2023-06-27 DIAGNOSIS — H81.10 BENIGN PAROXYSMAL POSITIONAL VERTIGO, UNSPECIFIED LATERALITY: ICD-10-CM

## 2023-06-27 DIAGNOSIS — M25.551 HIP PAIN, RIGHT: Primary | ICD-10-CM

## 2023-06-27 PROCEDURE — 95992 CANALITH REPOSITIONING PROC: CPT | Mod: GP

## 2023-06-27 PROCEDURE — 97140 MANUAL THERAPY 1/> REGIONS: CPT | Mod: GP,59

## 2023-06-27 PROCEDURE — 97162 PT EVAL MOD COMPLEX 30 MIN: CPT | Mod: GP

## 2023-06-27 NOTE — PROGRESS NOTES
PHYSICAL THERAPY EVALUATION  Type of Visit: Evaluation    See electronic medical record for Abuse and Falls Screening details.    Subjective      Presenting condition or subjective complaint: Pt is a 63 year old male referred to skilled PT services following dizziness/lightheadedness with position changes and rolling in bed. Pt reports dizziness with looking up, reaching down and getting in/out of bed. Pt reports he has  had a history of vertigo in the past but is not as bad this time. pt thought maybe it would go away but has continued. Pt reports vertigo started a few months ago when he was rolling in bed but is less severe. Pt is noticing dizziness when gettting on/off the floor while doing hip exercises.  Date of onset: 03/06/23    Relevant medical history:   obesity, tobacco use, sleep apnea, HTN, CAD  Dates & types of surgery: Hernia surgery 2022, L meniscus repair 2023    Prior diagnostic imaging/testing results: X-ray     Prior therapy history for the same diagnosis, illness or injury: No      Prior Level of Function   Transfers: Independent  Ambulation: Independent  ADL: Independent  IADL: Driving, Yard work    Living Environment  Social support: With a significant other or spouse   Type of home: House   Stairs to enter the home: Yes 13 Is there a railing: Yes   Ramp: No   Stairs inside the home: Yes 12 Is there a railing: Yes   Help at home: None  Equipment owned: Crutches     Employment: No    Hobbies/Interests:      Patient goals for therapy: decrease dizziness    Pain assessment: See objective evaluation for additional pain details     Objective     PALPATION: high tissue tension   RANGE OF MOTION: neck limited in ext and B side bending with most pain noted at C3 on the R    BED MOBILITY: Independent    TRANSFERS: Independent    GAIT:   Level of [place]: Independent  Assistive Device(s): None  Gait Deviations: WNL       VESTIBULAR EVALUATION  ADDITIONAL HISTORY:  Description of symptoms: Attacks of  dizziness; I feel like the room is spinning  Dizzy attacks:   Start: a few months ago   Last attack: today   Frequency of occurrences: daily   Length of attack: less than 1 min  Difficulty hearing:    Noise in ears? Yes popping sounds in L ear  Alleviates symptoms: waiting in upright position  Worsens symptoms: rolling to the R and laying down  Activities that bring on symptoms: Bending over  rolling in bed, supine<>sit    Pertinent visual history: pt wears glasses    Cervicogenic Screen    Neck ROM Abnormal     Oculomotor Screen    Ocular ROM Normal   Smooth Pursuit Normal   Saccades Normal   VOR Normal   VOR Cancellation Normal   Head Impulse Test Normal   Convergence Testing Normal        Infrared Goggle Exam Vestibular Suppressant in Last 24 Hours? No  Exam Completed With: frenzel lenses   Spontaneous Nystagmus Negative   Gaze Evoked Nystagmus Negative   Head Shake Horizontal Nystagmus Negative   Positional Testing no eye motion but some dizziness to the R patt hallpike        Left Right   Layland-Hallpike Negative no nystagmus but reproduction of symtoms. pt reports dizziness is mild and has been going on for months        [facility] Supine Roll Test Negative Negative   BPPV Canal(s): R Posterior  BPPV Type: Canalithasis    Dynamic Visual Acuity (DVA)    Static Acuity (LogMar)    Horizontal Head Movement at 1 Hz (LogMar)    Horizontal Head Movement at 2 Hz (LogMar)           Assessment & Plan   CLINICAL IMPRESSIONS   Medical Diagnosis: BPPV    Treatment Diagnosis: R posterior canal BPPV   Impression/Assessment: Patient is a 63 year old male with dizziness with rolling and getting out of bed complaints.  The following significant findings have been identified: Dizziness. These impairments interfere with their ability to perform self care tasks and household mobility as compared to previous level of function.     Clinical Decision Making (Complexity):   Clinical Presentation: Stable/Uncomplicated  Clinical Presentation  Rationale: based on medical and personal factors listed in PT evaluation  Clinical Decision Making (Complexity): Low complexity    PLAN OF CARE  Treatment Interventions:  Interventions: Gait Training, Manual Therapy, Neuromuscular Re-education, Therapeutic Activity, Therapeutic Exercise, Canalith Repositioning    Long Term Goals     PT Goal 1  Goal Identifier: dizziness  Goal Description: Pt will report 1 week free of dizziness with rolling in bed to increase tolerance to bed mobility.  Target Date: 08/08/23      Frequency of Treatment: 4 times  Duration of Treatment: 8 weeks    Education Assessment:   Learner/Method: Patient    Risks and benefits of evaluation/treatment have been explained.   Patient/Family/caregiver agrees with Plan of Care.     Evaluation Time:     PT Kathrine, Moderate Complexity Minutes (02022): 18    Signing Clinician: Barbie Parada PT      [facility]                                                                                   OUTPATIENT PHYSICAL THERAPY      PLAN OF TREATMENT FOR OUTPATIENT REHABILITATION   Patient's Last Name, First Name, Phi Noe YOB: 1960   Provider's Name   [facility]   Medical Record No.  [id number]     Onset Date: 03/06/23  Start of Care Date: 06/27/23     Medical Diagnosis:  BPPV      PT Treatment Diagnosis:  R posterior canal BPPV Plan of Treatment  Frequency/Duration: 4 times/ 8 weeks    Certification date from 06/27/23 to 08/22/23         See note for plan of treatment details and functional goals     Barbie Parada PT                         I CERTIFY THE NEED FOR THESE SERVICES FURNISHED UNDER        THIS PLAN OF TREATMENT AND WHILE UNDER MY CARE     (Physician attestation of this document indicates review and certification of the therapy plan).                  Referring Provider:  Nga Gautam      Initial Assessment  See Epic Evaluation- Start of Care Date:  06/27/23

## 2023-06-29 ENCOUNTER — THERAPY VISIT (OUTPATIENT)
Dept: PHYSICAL THERAPY | Facility: OTHER | Age: 63
End: 2023-06-29
Attending: NURSE PRACTITIONER
Payer: MEDICARE

## 2023-06-29 DIAGNOSIS — H81.10 BENIGN PAROXYSMAL POSITIONAL VERTIGO, UNSPECIFIED LATERALITY: Primary | ICD-10-CM

## 2023-06-29 DIAGNOSIS — M25.551 HIP PAIN, RIGHT: ICD-10-CM

## 2023-06-29 PROCEDURE — 97110 THERAPEUTIC EXERCISES: CPT | Mod: GP,CQ

## 2023-06-29 PROCEDURE — 97140 MANUAL THERAPY 1/> REGIONS: CPT | Mod: GP,CQ

## 2023-07-05 ENCOUNTER — THERAPY VISIT (OUTPATIENT)
Dept: PHYSICAL THERAPY | Facility: OTHER | Age: 63
End: 2023-07-05
Attending: NURSE PRACTITIONER
Payer: MEDICARE

## 2023-07-05 DIAGNOSIS — M25.551 HIP PAIN, RIGHT: ICD-10-CM

## 2023-07-05 DIAGNOSIS — H81.10 BENIGN PAROXYSMAL POSITIONAL VERTIGO, UNSPECIFIED LATERALITY: Primary | ICD-10-CM

## 2023-07-05 PROCEDURE — 97140 MANUAL THERAPY 1/> REGIONS: CPT | Mod: GP,CQ

## 2023-07-05 PROCEDURE — 97110 THERAPEUTIC EXERCISES: CPT | Mod: GP,CQ

## 2023-09-12 PROBLEM — H81.10 BENIGN PAROXYSMAL POSITIONAL VERTIGO, UNSPECIFIED LATERALITY: Status: RESOLVED | Noted: 2023-06-22 | Resolved: 2023-09-12

## 2023-09-12 PROBLEM — M25.551 HIP PAIN, RIGHT: Status: RESOLVED | Noted: 2023-06-22 | Resolved: 2023-09-12

## 2023-09-12 NOTE — PROGRESS NOTES
"   07/05/23 0500   Appointment Info   Signing clinician's name / credentials Fan Purcell   Total/Authorized Visits 4   Visits Used 4 of 10   Medical Diagnosis BPPV   PT Tx Diagnosis R posterior canal BPPV   Quick Adds Certification   Progress Note/Certification   Start of Care Date 06/27/23   Onset of illness/injury or Date of Surgery 03/06/23   Therapy Frequency 4 times   Predicted Duration 8 weeks   Certification date from 06/27/23   Certification date to 08/22/23   Progress Note Completed Date 06/27/23   Supervision   PT Assistant Visit Number 2   PT Goal 1   Goal Identifier dizziness   Goal Description Pt will report 1 week free of dizziness with rolling in bed to increase tolerance to bed mobility.   Goal Progress MET   Target Date 08/08/23   Date Met 07/05/23   PT Goal 2   Goal Identifier ROM   Goal Description Pt will demonstrate 80 deg of supine R SLR to increase ROM for dressing and normalized gait mechanics   Target Date 08/17/23   Goal Progress progressing, pt did not return for further sessions   PT Goal 3   Goal Identifier Gait stability   Goal Description Pt will report 1 week free of his R hip feeling unstable and giving out in order to increase stability for walking in his home and outdoors.   Target Date 08/17/23   Goal Progress MET   Date Met 07/05/23   Subjective Report   Subjective Report Pt arrived reporting no dizziness or issues/concerns with HEP; reports compliance. Pt stated \"my left knee has been knid of bugging me lately\" and \"its not bad but its just a constant pain/ache in the knee\"   Treatment Interventions (PT)   Interventions Manual Therapy;Standard Canalith Repositioning   Therapeutic Procedure/Exercise   Therapeutic Procedures: strength, endurance, ROM, flexibillity minutes (00952) 20   Ther Proc 1 Strength/ROM   Ther Proc 1 - Details Nustep x 6 minutes L4, Standing hip flexion x 10 reps (B) #2 ankle weight, Standing hip abduction x 10 reps (B) #2 ankle weight, Standing hip extension " x 10 reps (B) #2 ankle weight, Standing knee flexion x 10 reps (B) #2 ankle weight, Standing mini squats x 10 reps w/ chair behind for safety, Supine PROM hamstring stretch 4 x 15-30 sec (B), Supine PROM glute stretch (figure 4) 3 x 15-30 sec (B)   Manual Therapy   Manual Therapy: Mobilization, MFR, MLD, friction massage minutes (52779) 20   Manual Therapy 1 tissue assessment   Manual Therapy 1 - Details STM w/ pt supine on treatment table: Upper cervical/neck and B paraspinals, occipital release x 3 for 1 min each   Standard Canalith Repositioning   Canalith Repositioning - Details Epley for R posterior canal   Skilled Intervention 1 min in each position with pt limited in R neck rotation due to stiffness and pain. no increase in dizziness following   Education   Learner/Method Patient   Plan   Plan for next session assess canals if needed   Total Session Time   Timed Code Treatment Minutes 40   Total Treatment Time (sum of timed and untimed services) 40         DISCHARGE  Reason for Discharge: Patient chooses to discontinue therapy.    Equipment Issued: HEP    Discharge Plan: Patient to continue home program.    Referring Provider:  Nga Gautam

## 2023-10-03 ENCOUNTER — OFFICE VISIT (OUTPATIENT)
Dept: FAMILY MEDICINE | Facility: OTHER | Age: 63
End: 2023-10-03
Attending: FAMILY MEDICINE
Payer: MEDICARE

## 2023-10-03 VITALS
WEIGHT: 288.6 LBS | RESPIRATION RATE: 18 BRPM | TEMPERATURE: 98 F | DIASTOLIC BLOOD PRESSURE: 80 MMHG | OXYGEN SATURATION: 94 % | HEART RATE: 69 BPM | SYSTOLIC BLOOD PRESSURE: 136 MMHG | BODY MASS INDEX: 35.13 KG/M2

## 2023-10-03 DIAGNOSIS — S76.312A STRAIN OF LEFT HAMSTRING MUSCLE, INITIAL ENCOUNTER: Primary | ICD-10-CM

## 2023-10-03 PROCEDURE — 99214 OFFICE O/P EST MOD 30 MIN: CPT | Performed by: FAMILY MEDICINE

## 2023-10-03 PROCEDURE — G0463 HOSPITAL OUTPT CLINIC VISIT: HCPCS

## 2023-10-03 ASSESSMENT — PAIN SCALES - GENERAL: PAINLEVEL: SEVERE PAIN (7)

## 2023-10-03 NOTE — PROGRESS NOTES
"Sports Medicine Office Note    HPI:  63-year-old male coming in for evaluation of a left hamstring injury.  His injury occurred on 9/19.  He was stepping into his boat, left leg first, and stepped on something that rolled forward.  He \"did the splits\" and felt a pop in the posterior aspect of his thigh.  He had bruising down the posterior aspect of his thigh that subsequently followed.  Over time his symptoms have improved.  He rates his pain at 7/10.  He characterizes the pain as sharp, stabbing, and burning.  He has difficulty with sleeping due to this pain.  He has been using ice, OTC APAP, and OTC ibuprofen.  He has known arthritis in his left knee with plans for evaluation of this condition on 10/4.      EXAM:  /80 (BP Location: Right arm, Patient Position: Sitting, Cuff Size: Adult Large)   Pulse 69   Temp 98  F (36.7  C) (Temporal)   Resp 18   Wt 130.9 kg (288 lb 9.6 oz)   SpO2 94%   BMI 35.13 kg/m    MUSCULOSKELETAL EXAM:  LEFT HIP/THIGH  Inspection:  -No gross deformity  -No bruising or soft tissue swelling  - No palpable muscular defect    Tenderness to palpation of the:  -Proximal hamstring origin:  Negative  -Hamstring musculature: Mild pain in the mid muscle belly of semitendinosis/semimembranosus    Strength:  -Knee extension:  5/5  -Knee flexion:  5/5  -Hip abduction:  5/5  -Hip adduction:  5/5  -Hip flexion:  5/5  -Hip extension:  5/5    Special Tests:  -Logrolling maneuver:  Negative    Other:  -Intact sensation to light touch distally.  -No signs of cyanosis. Normal skin temperature of the lower extremity.  -Knee/foot/ankle:  No gross deformity. Full range of motion.  -Right hip:  No gross deformity. No palpable tenderness. Normal strength and ROM.      IMAGING:  None      ASSESSMENT/PLAN:  Diagnoses and all orders for this visit:  Strain of left hamstring muscle, initial encounter    63-year-old male with a left hamstring muscle belly strain.  No findings to suggest proximal avulsion " or significant muscular defect.  No indications for imaging at this time.  His symptoms are improving as would be expected.  This condition is best treated with therapy/rehab.  - Handout given with home exercises for hamstring strain  - Continue OTC analgesics as needed  - Activities as tolerated  - Follow-up as needed      Jose Carlos Lopez MD  10/3/2023  9:01 AM    Total time spent with this patient was 30 minutes which included chart review, visualization and independent interpretation of images, time spent with the patient, and documentation.    Procedure time:  0 minute(s)

## 2024-03-01 ENCOUNTER — MYC MEDICAL ADVICE (OUTPATIENT)
Dept: FAMILY MEDICINE | Facility: OTHER | Age: 64
End: 2024-03-01
Payer: MEDICARE

## 2024-05-09 ENCOUNTER — OFFICE VISIT (OUTPATIENT)
Dept: FAMILY MEDICINE | Facility: OTHER | Age: 64
End: 2024-05-09
Attending: NURSE PRACTITIONER
Payer: MEDICARE

## 2024-05-09 VITALS
OXYGEN SATURATION: 96 % | BODY MASS INDEX: 36.08 KG/M2 | DIASTOLIC BLOOD PRESSURE: 77 MMHG | SYSTOLIC BLOOD PRESSURE: 128 MMHG | HEART RATE: 90 BPM | RESPIRATION RATE: 16 BRPM | WEIGHT: 296.3 LBS | TEMPERATURE: 98.5 F | HEIGHT: 76 IN

## 2024-05-09 DIAGNOSIS — R42 VERTIGO: ICD-10-CM

## 2024-05-09 DIAGNOSIS — B96.89 BACTERIAL SINUSITIS: Primary | ICD-10-CM

## 2024-05-09 DIAGNOSIS — J32.9 BACTERIAL SINUSITIS: Primary | ICD-10-CM

## 2024-05-09 PROCEDURE — G0463 HOSPITAL OUTPT CLINIC VISIT: HCPCS

## 2024-05-09 PROCEDURE — 99213 OFFICE O/P EST LOW 20 MIN: CPT

## 2024-05-09 ASSESSMENT — PAIN SCALES - GENERAL: PAINLEVEL: MODERATE PAIN (5)

## 2024-05-09 NOTE — PROGRESS NOTES
ASSESSMENT/PLAN:    (J32.9,  B96.89) Bacterial sinusitis  (primary encounter diagnosis)  Comment: Patient has a 3-week history of sinus issues, he does have a cough and some fatigue.  He has a history of sinusitis.  On exam today vital signs are stable, he does have some mild sinus pressure.  Will opt to treat at this time for bacterial sinusitis with Augmentin.  Plan: amoxicillin-clavulanate (AUGMENTIN) 875-125 MG         tablet  Take antibiotic as ordered.  You may take with food.  Continue nasal saline and Flonase as discussed.     (R42) Vertigo  Comment: Patient reports that he has had a history of balance issues, he was evaluated by ENT with a balance study and was told he needed PT for vertigo.  This was done in [place], however he has moved up here for the summer and would like PT referral placed.  Plan: Physical Therapy  Referral    Discussed warning signs/symptoms indicative of need to f/u    Follow up if symptoms persist or worsen or concerns    I have reviewed the nursing notes.  I have reviewed the findings, diagnosis, plan and need for follow up with the patient.    I explained my diagnostic considerations and recommendations to the patient, who voiced understanding and agreement with the treatment plan. All questions were answered. We discussed potential side effects of any prescribed or recommended therapies, as well as expectations for response to treatments.    WINNIE VITAL, VICKI CNP  5/9/2024  1:05 PM    HPI:    Phi Davidson is a 64 year old male  who presents to [facility] today for concerns of sinusitis.    Patient has had 3-week history of sinus pressure.  He has a cough, fatigue, and sore throat as well. He has a history of sinusitis. .    Patient had a balance study by ENT in [place], he was told he needed PT for vertigo symptoms.     Allergies to meclizine and sulfa.    PCP: [facility]    Past Medical History:   Diagnosis Date    Chest pain, unspecified 9/10-9/11/99     Hospitalized - Chest pain - Non-cardiac    Coronary artery disease     Pure hypercholesterolemia     Tobacco use disorder     Tobacco abuse    Unspecified essential hypertension     Unspecified hearing loss     Unspecified sinusitis (chronic)     Recurrent     Past Surgical History:   Procedure Laterality Date    ANGIOGRAM  2009 @ [facility]    with stent placement - 2.5 x 15mm Xience drug-eluting stent    DAVINCI HERNIORRHAPHY INGUINAL Bilateral 2022    Procedure: HERNIORRHAPHY, BILATERAL INGUINAL, ROBOT-ASSISTED;  Surgeon: Michele Stearns MD;  [place] [facility] [place]     VASECTOMY UNILAT/BILAT W POSTOP SEMEN  9/10/98    Vasectomy    HERNIORRHAPHY UMBILICAL N/A 2022    Procedure: HERNIORRHAPHY, UMBILICAL, OPEN;  Surgeon: Michele Stearns MD;  Location: [facility] [place]     Social History     Tobacco Use    Smoking status: Former     Current packs/day: 0.00     Average packs/day: 1 pack/day for 20.0 years (20.0 ttl pk-yrs)     Types: Cigarettes     Start date: 1972     Quit date: 1992     Years since quittin.3    Smokeless tobacco: Never    Tobacco comments:     no secondhand smoke exposure   Substance Use Topics    Alcohol use: Yes     Comment: approx. 4-5 drinks per weekend     Current Outpatient Medications   Medication Sig Dispense Refill    ASPIRIN 81 MG OR TABS Take by mouth every morning 100 3    atenolol (TENORMIN) 50 MG tablet Take 1 tablet (50 mg) by mouth daily (Patient taking differently: Take 50 mg by mouth every morning) 90 tablet 3    azelastine (ASTELIN) 137 MCG/SPRAY nasal spray Spray 1-2 sprays into both nostrils 2 times daily (Patient taking differently: Spray 1-2 sprays into both nostrils as needed) 3 Bottle 3    ezetimibe (ZETIA) 10 MG tablet Take 10 mg by mouth daily      fluticasone (FLONASE) 50 MCG/ACT nasal spray Spray 1-2 sprays into both nostrils daily (Patient taking differently: Spray 1-2 sprays into both nostrils daily as needed) 1 Package 11    lisinopril  "(PRINIVIL,ZESTRIL) 5 MG tablet Take 1 tablet (5 mg) by mouth daily (Patient taking differently: Take 5 mg by mouth every morning) 90 tablet 3    ORDER FOR DME PRESCRIPTION FOR: replacement CPAP and tubing/mask as appropriate to continue at current settings 1 0    ORDER FOR DME cpap  0    rosuvastatin (CRESTOR) 20 MG tablet Take 1 tablet by mouth every evening      senna-docusate (SENOKOT-S/PERICOLACE) 8.6-50 MG tablet Take 1-2 tablets by mouth 2 times daily 30 tablet 0    testosterone (ANDROGEL 1% PUMP) 12.5 MG/ACT (1%) gel Apply 4 pumps (5 g of gel) from dispenser daily to clean, dry, intact skin of the shoulders, upper arms, or abdomen. (Patient taking differently: every morning Apply 4 pumps (5 g of gel) from dispenser daily to clean, dry, intact skin of the shoulders, upper arms, or abdomen.) 1 Month 11    traZODone (DESYREL) 150 MG tablet Take 0.5 tablets (75 mg) by mouth At Bedtime 45 tablet 3     Allergies   Allergen Reactions    Meclizine      Increased dizziness    Sulfa Antibiotics      hives     Past medical history, past surgical history, current medications and allergies reviewed and accurate to the best of my knowledge.      ROS:  Refer to HPI    /77 (BP Location: Left arm, Patient Position: Sitting, Cuff Size: Adult Large)   Pulse 90   Temp 98.5  F (36.9  C) (Temporal)   Resp 16   Ht 1.93 m (6' 4\")   Wt 134.4 kg (296 lb 4.8 oz)   SpO2 96%   BMI 36.07 kg/m      EXAM:  General Appearance: Well appearing 64 year old male, appropriate appearance for age. No acute distress   Eyes: conjunctivae normal without erythema or irritation, corneas clear, no drainage or crusting, no eyelid swelling, pupils equal   Oropharynx: moist mucous membranes, posterior pharynx without erythema, no exudates or petechiae, post nasal drip seen, no trismus, voice clear.    Sinuses:  Mild sinus tenderness.   Nose:  Bilateral nares: no erythema, no edema, no drainage or congestion   Neck: supple without " adenopathy  Respiratory: normal chest wall and respirations.  Normal effort.  Clear to auscultation bilaterally, no wheezing, crackles or rhonchi.  No increased work of breathing.  No cough appreciated.  Cardiac: RRR with no murmurs  Musculoskeletal:  Equal movement of bilateral upper extremities.  Equal movement of bilateral lower extremities.  Normal gait.    Dermatological: no rashes noted of exposed skin  Neuro: Alert and oriented to person, place, and time.  Cranial nerves II-XII grossly intact with no focal or lateralizing deficits.  Muscle tone normal.  Gait normal. No tremor.   Psychological: normal affect, alert, oriented, and pleasant.

## 2024-05-09 NOTE — NURSING NOTE
"Chief Complaint   Patient presents with    Sinus Problem     Persistent x 3 weeks    Cough    Fatigue    Pharyngitis   Patient presents to the clinic today for a possible sinus infection.Patient has no other complaints.    Initial /77 (BP Location: Left arm, Patient Position: Sitting, Cuff Size: Adult Large)   Pulse 90   Temp 98.5  F (36.9  C) (Temporal)   Resp 16   Ht 1.93 m (6' 4\")   Wt 134.4 kg (296 lb 4.8 oz)   SpO2 96%   BMI 36.07 kg/m   Estimated body mass index is 36.07 kg/m  as calculated from the following:    Height as of this encounter: 1.93 m (6' 4\").    Weight as of this encounter: 134.4 kg (296 lb 4.8 oz).  Medication Review: complete    The next two questions are to help us understand your food security.  If you are feeling you need any assistance in this area, we have resources available to support you today.           No data to display                  Health Care Directive:  Patient does not have a Health Care Directive or Living Will: Discussed advance care planning with patient; however, patient declined at this time.    Jolene Samuel      "

## 2024-08-24 ENCOUNTER — HEALTH MAINTENANCE LETTER (OUTPATIENT)
Age: 64
End: 2024-08-24

## 2025-06-23 NOTE — PROGRESS NOTES
Phi Davidson is a 62 year old male with a 3 week history of a left inguinal mass with the following symptoms of lump and pain.    Onset did occur with lifting.  Obstructive symptoms:  no  Urinary difficulties:  no  Chronic cough: no  Constipation:  no  Current level of activity:  Medium, actively retired, works farm, retired controller, home with family near [facility]    Past medical and surgical history, medications, allergies, family history, and social history were reviewed with the patient. Cardiac stent 2012    ROS: 10 point review of systems negative except noted in HPI  PHYSICAL EXAM  General appearance- healthy, alert, and in no distress.  Skin- Skin color and turgor normal.  No obvious rashes.  Neck- Neck is supple without obvious adenopathy.  Lungs- Respiratory effort unlabored.  Gait- Normal.  Overweight.  Abdomen - soft non distended, non tender with small umbilical hernia  BIH, L>R  Impression:  Inguinal hernia, bilateral and umbilical hernia  Recommendation:  Laparoscopic bilateral Inguinal Hernioplasty Robot assisted and open mesh repair umbilical hernia.    A full discussion regarding the alternatives, risks, goals, and potential complications for this surgery was completed today.  The patient understood I would use non recalled mesh and  that the potential problems included but are not limited to:  Infection, bleeding, hematoma, seroma, recurrence, injury to nerve/muscle or involved testicle(if male), ischemic orchitis(if male), and chronic pain.    The patient verbally expressed understanding, was given the opportunity for questions, and gives full informed consent for the procedure.      Today the patient was instructed on the need for a preoperative H&P, NPO status prior to surgery, and the need to stop anticoagulants prior to surgery.  Additional educational material, soap, and instructions will be mailed out to the patients home.    The total time spent with this patient was 30 minutes.  The  total time was spent on the date of encounter doing chart review, history and physical, dressing changes, documentation, patient education, and any further activity as noted above.             no

## (undated) DEVICE — LINEN TOWEL PACK X5 5464

## (undated) DEVICE — DAVINCI XI SEAL UNIVERSAL 5-8MM 470361

## (undated) DEVICE — ESU GROUND PAD ADULT W/CORD E7507

## (undated) DEVICE — DRSG STERI STRIP 1/2X4" R1547

## (undated) DEVICE — DRSG GAUZE 4X4" 3033

## (undated) DEVICE — DAVINCI XI DRAPE ARM 470015

## (undated) DEVICE — SU PDS II 2-0 SH 27" Z317H

## (undated) DEVICE — SU MONOCRYL 4-0 PS-2 18" UND Y496G

## (undated) DEVICE — SU PDS II 0 CT-2 27" Z334H

## (undated) DEVICE — PACK MINOR CUSTOM ASC

## (undated) DEVICE — SU STRATAFIX PDS PLUS 3-0 SPIRAL SH 15CM SXPP1B420

## (undated) DEVICE — GLOVE PROTEXIS POWDER FREE SMT 7.5  2D72PT75X

## (undated) DEVICE — SOL WATER IRRIG 500ML BOTTLE 2F7113

## (undated) DEVICE — PACK LAP CHOLE CUSTOM ASC

## (undated) DEVICE — SUCTION MANIFOLD NEPTUNE 2 SYS 4 PORT 0702-020-000

## (undated) DEVICE — BLADE CLIPPER SGL USE 9680

## (undated) DEVICE — DAVINCI XI DRAPE COLUMN 470341

## (undated) DEVICE — NDL INSUFFLATION 13GA 120MM C2201

## (undated) DEVICE — DAVINCI HOT SHEARS TIP COVER  400180

## (undated) DEVICE — SU VICRYL 0 CT-2 27" J334H

## (undated) DEVICE — DAVINCI XI NDL DRIVER LARGE 470006

## (undated) DEVICE — GOWN XLG DISP 9545

## (undated) DEVICE — SYR 30ML SLIP TIP W/O NDL 302833

## (undated) DEVICE — TAPE MEDIPORE 4"X2YD 2864

## (undated) DEVICE — SU VICRYL 3-0 SH 27" J316H

## (undated) DEVICE — PREP CHLORAPREP 26ML TINTED ORANGE  260815

## (undated) DEVICE — DRAPE LAP W/ARMBOARD 29410

## (undated) DEVICE — DRAPE MAYO STAND 23X54 8337

## (undated) DEVICE — GLOVE PROTEXIS W/NEU-THERA 7.5  2D73TE75

## (undated) DEVICE — DAVINCI XI GRASPER ENDOWRIST PROGRASP 470093

## (undated) DEVICE — ESU PENCIL W/COATED BLADE E2450H

## (undated) DEVICE — DAVINCI XI MONOPOLAR SCISSORS HOT SHEARS 8MM 470179

## (undated) DEVICE — SU MONOCRYL 4-0 PS-2 27" UND Y426H

## (undated) DEVICE — SOL NACL 0.9% IRRIG 500ML BOTTLE 2F7123

## (undated) DEVICE — SUPPORTER SCROTAL SUSPENSORY LG LATEX

## (undated) DEVICE — LINEN GOWN XLG 5407

## (undated) RX ORDER — OXYCODONE HYDROCHLORIDE 5 MG/1
TABLET ORAL
Status: DISPENSED
Start: 2022-07-21

## (undated) RX ORDER — FENTANYL CITRATE 50 UG/ML
INJECTION, SOLUTION INTRAMUSCULAR; INTRAVENOUS
Status: DISPENSED
Start: 2022-07-21

## (undated) RX ORDER — HYDROMORPHONE HYDROCHLORIDE 1 MG/ML
INJECTION, SOLUTION INTRAMUSCULAR; INTRAVENOUS; SUBCUTANEOUS
Status: DISPENSED
Start: 2022-07-21

## (undated) RX ORDER — GABAPENTIN 300 MG/1
CAPSULE ORAL
Status: DISPENSED
Start: 2022-07-21

## (undated) RX ORDER — CEFAZOLIN SODIUM 2 G/50ML
SOLUTION INTRAVENOUS
Status: DISPENSED
Start: 2022-07-21